# Patient Record
Sex: FEMALE | Race: WHITE | Employment: FULL TIME | URBAN - METROPOLITAN AREA
[De-identification: names, ages, dates, MRNs, and addresses within clinical notes are randomized per-mention and may not be internally consistent; named-entity substitution may affect disease eponyms.]

---

## 2017-06-01 ENCOUNTER — EXTERNAL ORDER RESULTS (OUTPATIENT)
Dept: HEALTH INFORMATION MANAGEMENT | Facility: CLINIC | Age: 52
End: 2017-06-01

## 2017-07-13 ENCOUNTER — TRANSFERRED RECORDS (OUTPATIENT)
Dept: HEALTH INFORMATION MANAGEMENT | Facility: CLINIC | Age: 52
End: 2017-07-13

## 2017-08-03 ENCOUNTER — TRANSFERRED RECORDS (OUTPATIENT)
Dept: HEALTH INFORMATION MANAGEMENT | Facility: CLINIC | Age: 52
End: 2017-08-03

## 2017-12-21 ENCOUNTER — OFFICE VISIT (OUTPATIENT)
Dept: FAMILY MEDICINE | Facility: CLINIC | Age: 52
End: 2017-12-21
Payer: COMMERCIAL

## 2017-12-21 VITALS
DIASTOLIC BLOOD PRESSURE: 86 MMHG | BODY MASS INDEX: 34.69 KG/M2 | HEIGHT: 67 IN | HEART RATE: 68 BPM | TEMPERATURE: 97.7 F | SYSTOLIC BLOOD PRESSURE: 138 MMHG | WEIGHT: 221 LBS

## 2017-12-21 DIAGNOSIS — E03.9 ACQUIRED HYPOTHYROIDISM: Primary | ICD-10-CM

## 2017-12-21 DIAGNOSIS — Z90.710 S/P HYSTERECTOMY WITH OOPHORECTOMY: ICD-10-CM

## 2017-12-21 DIAGNOSIS — M16.11 ARTHRITIS OF RIGHT HIP: ICD-10-CM

## 2017-12-21 DIAGNOSIS — Z90.721 S/P HYSTERECTOMY WITH OOPHORECTOMY: ICD-10-CM

## 2017-12-21 PROCEDURE — 84439 ASSAY OF FREE THYROXINE: CPT | Performed by: FAMILY MEDICINE

## 2017-12-21 PROCEDURE — 84443 ASSAY THYROID STIM HORMONE: CPT | Performed by: FAMILY MEDICINE

## 2017-12-21 PROCEDURE — 99203 OFFICE O/P NEW LOW 30 MIN: CPT | Performed by: FAMILY MEDICINE

## 2017-12-21 PROCEDURE — 36415 COLL VENOUS BLD VENIPUNCTURE: CPT | Performed by: FAMILY MEDICINE

## 2017-12-21 PROCEDURE — 84480 ASSAY TRIIODOTHYRONINE (T3): CPT | Performed by: FAMILY MEDICINE

## 2017-12-21 RX ORDER — LEVOTHYROXINE SODIUM 75 UG/1
75 TABLET ORAL DAILY
COMMUNITY
End: 2018-03-20

## 2017-12-21 NOTE — PROGRESS NOTES
"  SUBJECTIVE:   Gilma Hargrove is a 52 year old female who presents to clinic today for the following health issues:      Establish care       History of acquired hypothyroidism, does not recall diagnosis of Hashimoto's.     Has been on synthroid the past 2 years.     History of right hip OZ, has kept her from working out regularly, which she enjoys. Working to find an exercise routine that she can manage. Has not pursued injections or PT.     MOIZ-BSO 2009, has been on HRT since. Feels she is doing well. Remembers irritability and trouble sleeping before HRT. Aware of the risks. UTD with screening mammogram. Has not tried to wean at this point, OBGYN discussed weaning next year.       Problem list and histories reviewed & adjusted, as indicated.  Additional history: as documented    Patient Active Problem List   Diagnosis     Acquired hypothyroidism     S/P hysterectomy with oophorectomy     Arthritis of right hip     Past Surgical History:   Procedure Laterality Date     HYSTERECTOMY, PAP NO LONGER INDICATED         Social History   Substance Use Topics     Smoking status: Never Smoker     Smokeless tobacco: Never Used     Alcohol use Yes      Comment: occ     Family History   Problem Relation Age of Onset     Thyroid Cancer Mother      Other Cancer Father      pancrease     Arthritis Father              Reviewed and updated as needed this visit by clinical staff       Reviewed and updated as needed this visit by Provider         ROS:  Constitutional, HEENT, cardiovascular, pulmonary, gi and gu systems are negative, except as otherwise noted.      OBJECTIVE:   /86 (BP Location: Right arm, Patient Position: Sitting, Cuff Size: Adult Large)  Pulse 68  Temp 97.7  F (36.5  C) (Oral)  Ht 5' 7.25\" (1.708 m)  Wt 221 lb (100.2 kg)  Breastfeeding? No  BMI 34.36 kg/m2  Body mass index is 34.36 kg/(m^2).  GENERAL: healthy, alert and no distress  NECK: no adenopathy, no asymmetry, masses, or scars and thyroid normal to " palpation  RESP: lungs clear to auscultation - no rales, rhonchi or wheezes  CV: regular rate and rhythm, normal S1 S2, no S3 or S4, no murmur, click or rub, no peripheral edema and peripheral pulses strong  PSYCH: mentation appears normal, affect normal/bright    Diagnostic Test Results:  none     ASSESSMENT/PLAN:     1. Acquired hypothyroidism - will check levels today, refills based on labs  - levothyroxine (SYNTHROID/LEVOTHROID) 75 MCG tablet; Take 75 mcg by mouth daily  - estropipate (OGEN) 1.5 MG tablet; Take 1.5 mg by mouth daily  - FLUTICASONE PROPIONATE, NASAL, NA; Spray 50 mcg in nostril daily as needed  - TSH  - T4, free  - T3, total    2. S/P hysterectomy with oophorectomy - continue hormone, reviewed risks of HRT, discuss weaning 1 year.     3. Arthritis of right hip - discussed options, she will continue with her current plan for now.     Chyna Blunt MD  [facility]

## 2017-12-21 NOTE — MR AVS SNAPSHOT
"              After Visit Summary   12/21/2017    Gilma Hargrove    MRN: [id number]           Patient Information     Date Of Birth          1965        Visit Information        Provider Department      12/21/2017 4:30 PM Chyna Blunt MD [facility]        Today's Diagnoses     Acquired hypothyroidism    -  1    S/P hysterectomy with oophorectomy        Arthritis of right hip           Follow-ups after your visit        Who to contact     If you have questions or need follow up information about today's clinic visit or your schedule please contact [facility] directly at [phone].  Normal or non-critical lab and imaging results will be communicated to you by Yorx[employer]art, letter or phone within 4 business days after the clinic has received the results. If you do not hear from us within 7 days, please contact the clinic through Yorxshart or phone. If you have a critical or abnormal lab result, we will notify you by phone as soon as possible.  Submit refill requests through [facility] or call your pharmacy and they will forward the refill request to us. Please allow 3 business days for your refill to be completed.          Additional Information About Your Visit        MyChart Information     [facility] gives you secure access to your electronic health record. If you see a primary care provider, you can also send messages to your care team and make appointments. If you have questions, please call your primary care clinic.  If you do not have a primary care provider, please call [phone] and they will assist you.        Care EveryWhere ID     This is your Care EveryWhere ID. This could be used by other organizations to access your [place] medical records  [id number]        Your Vitals Were     Pulse Temperature Height Breastfeeding? BMI (Body Mass Index)       68 97.7  F (36.5  C) (Oral) 5' 7.25\" (1.708 m) No 34.36 kg/m2        Blood Pressure from Last 3 Encounters:   12/21/17 " 138/86    Weight from Last 3 Encounters:   12/21/17 221 lb (100.2 kg)              We Performed the Following     T3, total     T4, free     TSH        Primary Care Provider Office Phone # Fax #    Chyna Blunt [phone] [id number]  [facility] [address]        Equal Access to Services     [facility]: Hadii aad ku hadasho Soomaali, waaxda luqadaha, qaybta kaalmada adeegyada, waxay idiin hayaan adeeg khangelitosh laMartineaan . So [facility] [phone].    ATENCIÓN: Si habla español, tiene a kuo disposición servicios gratuitos de asistencia lingüística. Llame al [phone].    We comply with applicable federal civil rights laws and [place] laws. We do not discriminate on the basis of race, color, national origin, age, disability, sex, sexual orientation, or gender identity.            Thank you!     Thank you for choosing [facility]  for your care. Our goal is always to provide you with excellent care. Hearing back from our patients is one way we can continue to improve our services. Please take a few minutes to complete the written survey that you may receive in the mail after your visit with us. Thank you!             Your Updated Medication List - Protect others around you: Learn how to safely use, store and throw away your medicines at www.disposemymeds.org.          This list is accurate as of: 12/21/17 11:59 PM.  Always use your most recent med list.                   Brand Name Dispense Instructions for use Diagnosis    estropipate 1.5 MG tablet    OGEN     Take 1.5 mg by mouth daily    Acquired hypothyroidism       FLUTICASONE PROPIONATE (NASAL) NA      Spray 50 mcg in nostril daily as needed    Acquired hypothyroidism       levothyroxine 75 MCG tablet    SYNTHROID/LEVOTHROID     Take 75 mcg by mouth daily    Acquired hypothyroidism

## 2017-12-21 NOTE — Clinical Note
Please abstract the following data from this visit with this patient into the appropriate field in Epic:  Mammogram done on this date: 6/1/2017 (approximately), by this group: [facility], results were negative.

## 2017-12-21 NOTE — NURSING NOTE
"Chief Complaint   Patient presents with     Establish Care     tsh and refills needed.       Initial /86 (BP Location: Right arm, Patient Position: Sitting, Cuff Size: Adult Large)  Pulse 68  Temp 97.7  F (36.5  C) (Oral)  Ht 5' 7.25\" (1.708 m)  Wt 221 lb (100.2 kg)  Breastfeeding? No  BMI 34.36 kg/m2 Estimated body mass index is 34.36 kg/(m^2) as calculated from the following:    Height as of this encounter: 5' 7.25\" (1.708 m).    Weight as of this encounter: 221 lb (100.2 kg).  Medication Reconciliation: complete     Health maintenance- pt filled out david for last place. Waiting on records.     Earl Stock CMA            "

## 2017-12-22 LAB
T3 SERPL-MCNC: 95 NG/DL (ref 60–181)
T4 FREE SERPL-MCNC: 0.91 NG/DL (ref 0.76–1.46)
TSH SERPL DL<=0.005 MIU/L-ACNC: 1.72 MU/L (ref 0.4–4)

## 2017-12-23 ENCOUNTER — HEALTH MAINTENANCE LETTER (OUTPATIENT)
Age: 52
End: 2017-12-23

## 2018-01-02 ENCOUNTER — TELEPHONE (OUTPATIENT)
Dept: FAMILY MEDICINE | Facility: CLINIC | Age: 53
End: 2018-01-02

## 2018-01-02 NOTE — TELEPHONE ENCOUNTER
Pt calling with pain in right eye that is stabbing, watering a lot, swelling of eyelid but has had this in the past. She states the symptoms are starting to get better at this moment. Recently moved here and wondering who clinic recommends due to her history of dry eye for an eye doctor.  Recommendation of [facility] with phone number given to patient.    Hesham Jeronimo RN, BSN

## 2018-03-15 ENCOUNTER — OFFICE VISIT (OUTPATIENT)
Dept: FAMILY MEDICINE | Facility: CLINIC | Age: 53
End: 2018-03-15
Payer: COMMERCIAL

## 2018-03-15 VITALS
SYSTOLIC BLOOD PRESSURE: 126 MMHG | DIASTOLIC BLOOD PRESSURE: 82 MMHG | HEART RATE: 73 BPM | HEIGHT: 67 IN | TEMPERATURE: 97.9 F | WEIGHT: 222 LBS | BODY MASS INDEX: 34.84 KG/M2

## 2018-03-15 DIAGNOSIS — M77.8 DELTOID TENDINITIS OF LEFT SHOULDER: ICD-10-CM

## 2018-03-15 DIAGNOSIS — M75.92 LEFT SUPRASPINATUS TENDINITIS: Primary | ICD-10-CM

## 2018-03-15 DIAGNOSIS — Z12.11 SPECIAL SCREENING FOR MALIGNANT NEOPLASMS, COLON: ICD-10-CM

## 2018-03-15 PROCEDURE — 99213 OFFICE O/P EST LOW 20 MIN: CPT | Performed by: FAMILY MEDICINE

## 2018-03-15 NOTE — MR AVS SNAPSHOT
After Visit Summary   3/15/2018    Gilma Hargrove    MRN: [id number]           Patient Information     Date Of Birth          1965        Visit Information        Provider Department      3/15/2018 3:40 PM Chyna Blunt MD [facility]        Today's Diagnoses     Left supraspinatus tendinitis    -  1    Deltoid tendinitis of left shoulder        Special screening for malignant neoplasms, colon           Follow-ups after your visit        Additional Services     [facility] PT, HAND, AND CHIROPRACTIC REFERRAL       **This order will print in the [facility] Scheduling Office**    Physical Therapy, Hand Therapy and Chiropractic Care are available through:    *[facility]  *[facility]  *[facility]    Call one number to schedule at any of the above locations: [phone].    Your provider has referred you to: Physical Therapy at [facility] or [place]    Indication/Reason for Referral: Shoulder Pain, left  Onset of Illness: months  Therapy Orders: Evaluate and Treat  Special Programs: None  Special Request: None    Denae Philippe      Additional Comments for the Therapist or Chiropractor:    Please be aware that coverage of these services is subject to the terms and limitations of your health insurance plan.  Call member services at your health plan with any benefit or coverage questions.      Please bring the following to your appointment:    *Your personal calendar for scheduling future appointments  *Comfortable clothing                  Future tests that were ordered for you today     Open Future Orders        Priority Expected Expires Ordered    Fecal colorectal cancer screen (FIT) Routine 4/5/2018 6/7/2018 3/15/2018            Who to contact     If you have questions or need follow up information about today's clinic visit or your schedule please contact [facility] directly at [phone].  Normal or non-critical lab and imaging  "results will be communicated to you by MyChart, letter or phone within 4 business days after the clinic has received the results. If you do not hear from us within 7 days, please contact the clinic through [employer] or phone. If you have a critical or abnormal lab result, we will notify you by phone as soon as possible.  Submit refill requests through [employer] or call your pharmacy and they will forward the refill request to us. Please allow 3 business days for your refill to be completed.          Additional Information About Your Visit        [employer] Information     [employer] gives you secure access to your electronic health record. If you see a primary care provider, you can also send messages to your care team and make appointments. If you have questions, please call your primary care clinic.  If you do not have a primary care provider, please call [phone] and they will assist you.        Care EveryWhere ID     This is your Care EveryWhere ID. This could be used by other organizations to access your [place] medical records  [id number]        Your Vitals Were     Pulse Temperature Height Breastfeeding? BMI (Body Mass Index)       73 97.9  F (36.6  C) (Oral) 5' 7.25\" (1.708 m) No 34.51 kg/m2        Blood Pressure from Last 3 Encounters:   03/15/18 126/82   12/21/17 138/86    Weight from Last 3 Encounters:   03/15/18 222 lb (100.7 kg)   12/21/17 221 lb (100.2 kg)              We Performed the Following     [facility] PT, HAND, AND CHIROPRACTIC REFERRAL        Primary Care Provider Office Phone # Fax #    Chyna Carlos Alberto Blunt [phone] [id number]  [facility] [address]        Equal Access to Services     [facility] JANAE : Hadii charlene cui Solalitha, waaxda luqadaha, qaybta kaalmada marilyn franco. So [facility] [phone].    ATENCIÓN: Si habla español, tiene a kuo disposición servicios gratuitos de asistencia lingüística. Llame al [phone].    We comply " with applicable federal civil rights laws and [place] laws. We do not discriminate on the basis of race, color, national origin, age, disability, sex, sexual orientation, or gender identity.            Thank you!     Thank you for choosing [facility]  for your care. Our goal is always to provide you with excellent care. Hearing back from our patients is one way we can continue to improve our services. Please take a few minutes to complete the written survey that you may receive in the mail after your visit with us. Thank you!             Your Updated Medication List - Protect others around you: Learn how to safely use, store and throw away your medicines at www.disposemymeds.org.          This list is accurate as of 3/15/18  4:14 PM.  Always use your most recent med list.                   Brand Name Dispense Instructions for use Diagnosis    estropipate 1.5 MG tablet    OGEN     Take 1.5 mg by mouth daily    Acquired hypothyroidism       FLUTICASONE PROPIONATE (NASAL) NA      Spray 50 mcg in nostril daily as needed    Acquired hypothyroidism       levothyroxine 75 MCG tablet    SYNTHROID/LEVOTHROID     Take 75 mcg by mouth daily    Acquired hypothyroidism

## 2018-03-15 NOTE — PROGRESS NOTES
"  SUBJECTIVE:   Gilma Hargrove is a 52 year old female who presents to clinic today for the following health issues:      Musculoskeletal problem/pain      Duration: off and on since last fall    Description  Location: left deltoid    Intensity:  2/10. Gets to 4/10 with movement    Accompanying signs and symptoms: radiation of pain to upper shoulder    History  Previous similar problem: YES  Previous evaluation:  none    Precipitating or alleviating factors:  Trauma or overuse: not sure how injury occurred  Aggravating factors include: raising the arm up. Range of motion is down    Therapies tried and outcome: ice and stretching helps      Pain over the lateral shoulder.  First started after her move to [place], unloading boxes.   Overall pain has improved, but still lingering.     No history of shoulder issues.       Problem list and histories reviewed & adjusted, as indicated.  Additional history: none    Patient Active Problem List   Diagnosis     Acquired hypothyroidism     S/P hysterectomy with oophorectomy     Arthritis of right hip     Past Surgical History:   Procedure Laterality Date     HYSTERECTOMY, PAP NO LONGER INDICATED         Social History   Substance Use Topics     Smoking status: Never Smoker     Smokeless tobacco: Never Used     Alcohol use Yes      Comment: occ     Family History   Problem Relation Age of Onset     Thyroid Cancer Mother      Other Cancer Father      pancrease     Arthritis Father            Reviewed and updated as needed this visit by clinical staff  Allergies       Reviewed and updated as needed this visit by Provider         ROS:  Constitutional, HEENT, cardiovascular, pulmonary, gi and gu systems are negative, except as otherwise noted.    OBJECTIVE:     /82 (BP Location: Right arm, Patient Position: Sitting, Cuff Size: Adult Large)  Pulse 73  Temp 97.9  F (36.6  C) (Oral)  Ht 5' 7.25\" (1.708 m)  Wt 222 lb (100.7 kg)  Breastfeeding? No  BMI 34.51 kg/m2  Body mass index is " 34.51 kg/(m^2).  GENERAL: healthy, alert and no distress  MS: left shoulder - abduction and internal rotation limited 2/2 pain, normal external rotation, + Obrien, ttp over deltoid and supraspinatous tendon insertion, negative empty can testing    Diagnostic Test Results:  none     ASSESSMENT/PLAN:     1. Left supraspinatus tendinitis - discussed likely diagnosis, suggested PT as our first step.   - [facility] PT, HAND, AND CHIROPRACTIC REFERRAL    2. Deltoid tendinitis of left shoulder - as above  - [facility] PT, HAND, AND CHIROPRACTIC REFERRAL    3. Special screening for malignant neoplasms, colon   - Fecal colorectal cancer screen (FIT); Future    Chyna Blunt MD  [facility]

## 2018-03-15 NOTE — NURSING NOTE
"  Chief Complaint   Patient presents with     Musculoskeletal Problem       Initial /82 (BP Location: Right arm, Patient Position: Sitting, Cuff Size: Adult Large)  Pulse 73  Temp 97.9  F (36.6  C) (Oral)  Ht 5' 7.25\" (1.708 m)  Wt 222 lb (100.7 kg)  Breastfeeding? No  BMI 34.51 kg/m2 Estimated body mass index is 34.51 kg/(m^2) as calculated from the following:    Height as of this encounter: 5' 7.25\" (1.708 m).    Weight as of this encounter: 222 lb (100.7 kg).  Medication Reconciliation: complete      Health maintenance- hysterectomy cervix removed. Colonoscopy due. Fit test    Earl Stock CMA          "

## 2018-03-20 DIAGNOSIS — E03.9 ACQUIRED HYPOTHYROIDISM: ICD-10-CM

## 2018-03-20 NOTE — TELEPHONE ENCOUNTER
"Requested Prescriptions   Pending Prescriptions Disp Refills     levothyroxine (SYNTHROID/LEVOTHROID) 75 MCG tablet 30 tablet       MEDICATION IS HISTORICAL    Last Written Prescription Date:  12/21/2017  Last Fill Quantity: ,  # refills:    Last office visit: 3/15/2018 with prescribing provider:  03/15/2018   Future Office Visit:     Sig: Take 1 tablet (75 mcg) by mouth daily    Thyroid Protocol Passed    3/20/2018  7:35 AM       Passed - Patient is 12 years or older       Passed - Recent (12 mo) or future (30 days) visit within the authorizing provider's specialty    Patient had office visit in the last 12 months or has a visit in the next 30 days with authorizing provider or within the authorizing provider's specialty.  See \"Patient Info\" tab in inbasket, or \"Choose Columns\" in Meds & Orders section of the refill encounter.           Passed - Normal TSH on file in past 12 months    Recent Labs   Lab Test  12/21/17   1721   TSH  1.72             Passed - No active pregnancy on record    If patient is pregnant or has had a positive pregnancy test, please check TSH.         Passed - No positive pregnancy test in past 12 months    If patient is pregnant or has had a positive pregnancy test, please check TSH.            Sunny Wallace XRT  "

## 2018-03-20 NOTE — TELEPHONE ENCOUNTER
Routing refill request to provider for review/approval because:  Medication is reported/historical  Hesham Jeronimo RN, BSN

## 2018-03-21 RX ORDER — LEVOTHYROXINE SODIUM 75 UG/1
75 TABLET ORAL DAILY
Qty: 90 TABLET | Refills: 3 | Status: SHIPPED | OUTPATIENT
Start: 2018-03-21 | End: 2019-03-13

## 2018-04-02 ENCOUNTER — THERAPY VISIT (OUTPATIENT)
Dept: PHYSICAL THERAPY | Facility: CLINIC | Age: 53
End: 2018-04-02
Payer: COMMERCIAL

## 2018-04-02 DIAGNOSIS — M75.02 ADHESIVE CAPSULITIS OF LEFT SHOULDER: Primary | ICD-10-CM

## 2018-04-02 PROCEDURE — 97140 MANUAL THERAPY 1/> REGIONS: CPT | Mod: GP | Performed by: PHYSICAL THERAPIST

## 2018-04-02 PROCEDURE — 97110 THERAPEUTIC EXERCISES: CPT | Mod: GP | Performed by: PHYSICAL THERAPIST

## 2018-04-02 PROCEDURE — 97161 PT EVAL LOW COMPLEX 20 MIN: CPT | Mod: GP | Performed by: PHYSICAL THERAPIST

## 2018-04-02 NOTE — PROGRESS NOTES
[facility] Initial Evaluation  Subjective:  Patient is a 52 year old female presenting with rehab left shoulder hpi.           Pt describes left shoulder pain and limited ROM.  Began in the Fall of 2017 after lifting heavy boxes and objects while moving.  The pain severity has slowly improved, but her ROM has worsened.  .    Patient reports pain:  Lateral and upper arm.    Pain is described as stabbing and is intermittent and reported as 9/10.  Associated symptoms:  Loss of motion/stiffness. Pain is the same all the time.  Symptoms are exacerbated by lying on extremity, using arm overhead and using arm behind back and relieved by nothing.  Since onset symptoms are unchanged.        General health as reported by patient is good.                  Barriers include:  None as reported by the patient.    Red flags:  None as reported by the patient.                        Objective:  System                   Shoulder Evaluation:  ROM:  AROM:    Flexion:  Left:  100    Right:  Full    Abduction:  Left: 90   Right:  Full                  Extension/Internal Rotation:  Left:  Bottom of sacrum    Right:  T7    PROM:    Flexion:  Left:  117    Right: 163      Abduction:  Left:  90    Right:  180    Internal Rotation:  Left:  14    Right:  49  External Rotation:  Left:  27    Right:  92                    Strength:    Flexion: Left:5/5    Pain: -/+        Abduction:  Left: 5/5   Pain:-/+        Internal Rotation:  Left:5/5      Pain:-/+      External Rotation:   Left:5/5     Pain:         Elbow Flexion:  Left:5/5     Pain:              Mobility Tests:        Glenohumeral inferior left:  Hypomobile                                             General     ROS    Assessment/Plan:    Patient is a 52 year old female with left side shoulder complaints.    Patient has the following significant findings with corresponding treatment plan.                Diagnosis 1:  Left adhesive capsulitis  Pain -  hot/cold therapy,  manual therapy, education and home program  Decreased ROM/flexibility - manual therapy, therapeutic exercise and home program    Therapy Evaluation Codes:   1) History comprised of:   Personal factors that impact the plan of care:      None.    Comorbidity factors that impact the plan of care are:      None.     Medications impacting care: None.  2) Examination of Body Systems comprised of:   Body structures and functions that impact the plan of care:      Shoulder.   Activity limitations that impact the plan of care are:      Dressing and Lifting.  3) Clinical presentation characteristics are:   Stable/Uncomplicated.  4) Decision-Making    Low complexity using standardized patient assessment instrument and/or measureable assessment of functional outcome.  Cumulative Therapy Evaluation is: Low complexity.    Previous and current functional limitations:  (See Goal Flow Sheet for this information)    Short term and Long term goals: (See Goal Flow Sheet for this information)     Communication ability:  Patient appears to be able to clearly communicate and understand verbal and written communication and follow directions correctly.  Treatment Explanation - The following has been discussed with the patient:   RX ordered/plan of care  Anticipated outcomes  Possible risks and side effects  This patient would benefit from PT intervention to resume normal activities.   Rehab potential is good.    Frequency:  2 X a month, once daily  Duration:  for 3 months  Discharge Plan:  Achieve all LTG.  Independent in home treatment program.  Reach maximal therapeutic benefit.    Please refer to the daily flowsheet for treatment today, total treatment time and time spent performing 1:1 timed codes.

## 2018-04-03 NOTE — PROGRESS NOTES
[facility] Initial Evaluation  Subjective:  Patient is a 52 year old female presenting with rehab left ankle/foot hpi.                                      Pertinent medical history includes:  History of fractures, overweight, high blood pressure, thyroid problems, anemia, asthma and other.  Medical allergies: sulpha drugs, codine.  Surgical history: left leg x2, abdominal x2, last one total hysterectomy.  Current medications:  Hormone replacement therapy and thyroid medication.  Current occupation is education.    Employment tasks: classroom- so it varies between standing, moving, sitting, computer.                                Objective:  System    Physical Exam    General     ROS    Assessment/Plan:

## 2018-04-05 ENCOUNTER — THERAPY VISIT (OUTPATIENT)
Dept: PHYSICAL THERAPY | Facility: CLINIC | Age: 53
End: 2018-04-05
Payer: COMMERCIAL

## 2018-04-05 DIAGNOSIS — M75.02 ADHESIVE CAPSULITIS OF LEFT SHOULDER: ICD-10-CM

## 2018-04-05 PROCEDURE — 97110 THERAPEUTIC EXERCISES: CPT | Mod: GP | Performed by: PHYSICAL THERAPIST

## 2018-04-05 PROCEDURE — 97140 MANUAL THERAPY 1/> REGIONS: CPT | Mod: GP | Performed by: PHYSICAL THERAPIST

## 2018-04-19 ENCOUNTER — THERAPY VISIT (OUTPATIENT)
Dept: PHYSICAL THERAPY | Facility: CLINIC | Age: 53
End: 2018-04-19
Payer: COMMERCIAL

## 2018-04-19 DIAGNOSIS — M75.02 ADHESIVE CAPSULITIS OF LEFT SHOULDER: ICD-10-CM

## 2018-04-19 PROCEDURE — 97140 MANUAL THERAPY 1/> REGIONS: CPT | Mod: GP | Performed by: PHYSICAL THERAPIST

## 2018-04-19 PROCEDURE — 97110 THERAPEUTIC EXERCISES: CPT | Mod: GP | Performed by: PHYSICAL THERAPIST

## 2018-07-23 ENCOUNTER — OFFICE VISIT (OUTPATIENT)
Dept: FAMILY MEDICINE | Facility: CLINIC | Age: 53
End: 2018-07-23
Payer: COMMERCIAL

## 2018-07-23 VITALS
OXYGEN SATURATION: 98 % | DIASTOLIC BLOOD PRESSURE: 68 MMHG | TEMPERATURE: 97.8 F | SYSTOLIC BLOOD PRESSURE: 123 MMHG | BODY MASS INDEX: 34.2 KG/M2 | WEIGHT: 217.9 LBS | HEART RATE: 106 BPM | HEIGHT: 67 IN

## 2018-07-23 DIAGNOSIS — Z90.710 S/P HYSTERECTOMY WITH OOPHORECTOMY: Primary | ICD-10-CM

## 2018-07-23 DIAGNOSIS — Z12.11 SPECIAL SCREENING FOR MALIGNANT NEOPLASMS, COLON: ICD-10-CM

## 2018-07-23 DIAGNOSIS — Z90.721 S/P HYSTERECTOMY WITH OOPHORECTOMY: Primary | ICD-10-CM

## 2018-07-23 PROCEDURE — 99213 OFFICE O/P EST LOW 20 MIN: CPT | Performed by: FAMILY MEDICINE

## 2018-07-23 RX ORDER — CETIRIZINE HYDROCHLORIDE 10 MG/1
10 TABLET ORAL DAILY
COMMUNITY

## 2018-07-23 NOTE — PROGRESS NOTES
"  SUBJECTIVE:   Gilma Hargrove is a 52 year old female who presents to clinic today for the following health issues:      Medication Followup of estropipate    Taking Medication as prescribed: yes - wants to know if she should be tapering off     Side Effects:  none    Medication Helping Symptoms:  Yes - helping with hot flashes       Taking 1.5 mg Ogen daily since her MOIZ-SBO 3 years ago. Was told by her OBGYN that she should taper off around this time.     Having mild hot flashes currently.   Initially, she was having intense mood swings and hot flashes.       Problem list and histories reviewed & adjusted, as indicated.  Additional history: none    Patient Active Problem List   Diagnosis     Acquired hypothyroidism     S/P hysterectomy with oophorectomy     Arthritis of right hip     Adhesive capsulitis of left shoulder     Past Surgical History:   Procedure Laterality Date     HYSTERECTOMY, PAP NO LONGER INDICATED         Social History   Substance Use Topics     Smoking status: Never Smoker     Smokeless tobacco: Never Used     Alcohol use Yes      Comment: occ     Family History   Problem Relation Age of Onset     Thyroid Cancer Mother      Other Cancer Father      pancrease     Arthritis Father            Reviewed and updated as needed this visit by clinical staff  Allergies       Reviewed and updated as needed this visit by Provider         ROS:  Constitutional, HEENT, cardiovascular, pulmonary, gi and gu systems are negative, except as otherwise noted.    OBJECTIVE:     /68 (BP Location: Right arm, Patient Position: Chair, Cuff Size: Adult Large)  Pulse 106  Temp 97.8  F (36.6  C) (Oral)  Ht 5' 7.25\" (1.708 m)  Wt 217 lb 14.4 oz (98.8 kg)  SpO2 98%  BMI 33.87 kg/m2  Body mass index is 33.87 kg/(m^2).  GENERAL: healthy, alert and no distress  PSYCH: mentation appears normal, affect normal/bright    Diagnostic Test Results:  none     ASSESSMENT/PLAN:     1. S/P hysterectomy with oophorectomy - " reviewed tapering dose today, working to get off HRT next 2-3 years as tolerated. Reviewed potential risks of HRT.Cut dose in half today, RTC in 5 months for RHM and discuss weaning med as tolerated. Advised she contact me if issues with taper.    - estropipate (OGEN) 0.75 MG tablet; Take 1 tablet (0.75 mg) by mouth daily  Dispense: 90 tablet; Refill: 1    2. Special screening for malignant neoplasms, colon  - Fecal colorectal cancer screen (FIT); Future      Chyna Blunt MD  [facility]

## 2018-07-23 NOTE — MR AVS SNAPSHOT
After Visit Summary   7/23/2018    Gilma Hargrove    MRN: [id number]           Patient Information     Date Of Birth          1965        Visit Information        Provider Department      7/23/2018 11:20 AM Chyna Blunt MD [facility]        Today's Diagnoses     S/P hysterectomy with oophorectomy    -  1    Special screening for malignant neoplasms, colon           Follow-ups after your visit        Follow-up notes from your care team     Return in about 5 months (around 12/23/2018) for Yearly Physical Exam.      Future tests that were ordered for you today     Open Future Orders        Priority Expected Expires Ordered    Fecal colorectal cancer screen (FIT) Routine 8/13/2018 10/15/2018 7/23/2018            Who to contact     If you have questions or need follow up information about today's clinic visit or your schedule please contact [facility] directly at [phone].  Normal or non-critical lab and imaging results will be communicated to you by MyChart, letter or phone within 4 business days after the clinic has received the results. If you do not hear from us within 7 days, please contact the clinic through [employer] or phone. If you have a critical or abnormal lab result, we will notify you by phone as soon as possible.  Submit refill requests through [employer] or call your pharmacy and they will forward the refill request to us. Please allow 3 business days for your refill to be completed.          Additional Information About Your Visit        MyChart Information     [facility] gives you secure access to your electronic health record. If you see a primary care provider, you can also send messages to your care team and make appointments. If you have questions, please call your primary care clinic.  If you do not have a primary care provider, please call [phone] and they will assist you.        Care EveryWhere ID     This is your Care EveryWhere ID. This  "could be used by other organizations to access your [facility] medical records  [id number]        Your Vitals Were     Pulse Temperature Height Pulse Oximetry BMI (Body Mass Index)       106 97.8  F (36.6  C) (Oral) 5' 7.25\" (1.708 m) 98% 33.87 kg/m2        Blood Pressure from Last 3 Encounters:   07/23/18 123/68   03/15/18 126/82   12/21/17 138/86    Weight from Last 3 Encounters:   07/23/18 217 lb 14.4 oz (98.8 kg)   03/15/18 222 lb (100.7 kg)   12/21/17 221 lb (100.2 kg)                 Today's Medication Changes          These changes are accurate as of 7/23/18 11:38 AM.  If you have any questions, ask your nurse or doctor.               These medicines have changed or have updated prescriptions.        Dose/Directions    estropipate 0.75 MG tablet   Commonly known as:  OGEN   This may have changed:    - medication strength  - how much to take   Used for:  S/P hysterectomy with oophorectomy   Changed by:  Chyna Blunt MD        Dose:  0.75 mg   Take 1 tablet (0.75 mg) by mouth daily   Quantity:  90 tablet   Refills:  1            Where to get your medicines      These medications were sent to [facility] [place] [id number] [place] [address] [id number] [place] [address] AT [address]  [address] [facility] [address]     Phone:  [phone]     estropipate 0.75 MG tablet                Primary Care Provider Office Phone # Fax #    Chyna Blunt [phone] [address] [facility] [address]        Equal Access to Services     MARSHALL CARDOSO AH: Hadii charlene cui Solalitha, waaxda luqadaha, qaybta kaalmada adeegyada, marilyn echols. So [facility] [phone].    ATENCIÓN: Si habla español, tiene a kuo disposición servicios gratuitos de asistencia lingüística. Llame al [phone].    We comply with applicable federal civil rights laws and [place] laws. We do not discriminate on the basis of race, color, national origin, age, disability, sex, " sexual orientation, or gender identity.            Thank you!     Thank you for choosing [facility]  for your care. Our goal is always to provide you with excellent care. Hearing back from our patients is one way we can continue to improve our services. Please take a few minutes to complete the written survey that you may receive in the mail after your visit with us. Thank you!             Your Updated Medication List - Protect others around you: Learn how to safely use, store and throw away your medicines at www.disposemymeds.org.          This list is accurate as of 7/23/18 11:38 AM.  Always use your most recent med list.                   Brand Name Dispense Instructions for use Diagnosis    cetirizine 10 MG tablet    zyrTEC     Take 10 mg by mouth daily    S/P hysterectomy with oophorectomy       estropipate 0.75 MG tablet    OGEN    90 tablet    Take 1 tablet (0.75 mg) by mouth daily    S/P hysterectomy with oophorectomy       FLUTICASONE PROPIONATE (NASAL) NA      Spray 50 mcg in nostril daily as needed    Acquired hypothyroidism       levothyroxine 75 MCG tablet    SYNTHROID/LEVOTHROID    90 tablet    Take 1 tablet (75 mcg) by mouth daily    Acquired hypothyroidism

## 2018-08-03 PROBLEM — M75.02 ADHESIVE CAPSULITIS OF LEFT SHOULDER: Status: RESOLVED | Noted: 2018-04-02 | Resolved: 2018-08-03

## 2018-08-03 NOTE — PROGRESS NOTES
Subjective:  HPI                    Objective:  System    Physical Exam    General     ROS    Assessment/Plan:    DISCHARGE REPORT    Progress reporting period is from 4/2/18 to 4/19/18 (3 visits).       SUBJECTIVE  Subjective changes noted by patient:  Gilma did not attend further PT following her last visit on 4/19/18.  Her status at that time was as follows: Subjective: Can reach behind back better and above shoulder level better.  Some nights has no pain, and other nights awakens with pain.    Current pain level is NA  .     Previous pain level was   Initial Pain level: 9/10.   Changes in function:  Unknown.  Adverse reaction to treatment or activity: None    OBJECTIVE  Changes noted in objective findings:  The objective findings below are from DOS 4/19/18.  Objective: PROM: ER=45 (in 45 degrees abd), Flex=142, IR=29.  AROM: Flex=122.     ASSESSMENT/PLAN  Updated problem list and treatment plan: Diagnosis 1:  Left shoulder adhesive capsulitis    STG/LTGs have been met or progress has been made towards goals:  Unknown.  Assessment of Progress: The patient has not returned to therapy. Current status is unknown.  Self Management Plans:  Patient has been instructed in a home treatment program.    Gilma continues to require the following intervention to meet STG and LTG's:  PT intervention is no longer required to meet STG/LTG.    Recommendations:  This patient is ready to be discharged from therapy and continue their home treatment program.    Please refer to the daily flowsheet for treatment today, total treatment time and time spent performing 1:1 timed codes.

## 2018-09-19 ENCOUNTER — TELEPHONE (OUTPATIENT)
Dept: FAMILY MEDICINE | Facility: CLINIC | Age: 53
End: 2018-09-19

## 2018-09-19 DIAGNOSIS — Z90.710 S/P HYSTERECTOMY WITH OOPHORECTOMY: Primary | ICD-10-CM

## 2018-09-19 DIAGNOSIS — Z90.721 S/P HYSTERECTOMY WITH OOPHORECTOMY: Primary | ICD-10-CM

## 2018-09-19 NOTE — TELEPHONE ENCOUNTER
"Pt calling with update on symptoms since decreasing hormone.     Pt decreased from 1.5 mg Ogen to 0.75 mg the first part of August.       She has noted not sleeping as well again, feeling \"foogy\"   More emotional \"I am stone and weepy at times\"    She has noted some increase in night time hot flashes but nothing during the day time hours.      She has also noted hives started on arms and shoulders which was an issue before she started med and also ankle/foot swelling, \"this has improved some with the weather cooling down\"     Please advise more time or increase in medication     Angela Schmitt RN    "

## 2018-09-20 NOTE — TELEPHONE ENCOUNTER
Pt states she does want to give it a bit more time.       Will notify clinic if goes back to increased dosing.     Angela Schmitt RN

## 2018-09-20 NOTE — TELEPHONE ENCOUNTER
She can either give more time or increase dose. I have sent in #30 1.5 mg Ogen for her if she decides on the latter. JH

## 2018-09-26 NOTE — TELEPHONE ENCOUNTER
Fax from pharmacy ([facility])   Estropipate is unavailable in either 1.5 or 0.75 mg     Per PCP can switch to estrogen product if desires     LM for back     Angela Schmitt RN

## 2018-10-02 RX ORDER — ESTRADIOL 0.5 MG/1
0.5 TABLET ORAL DAILY
Qty: 90 TABLET | Refills: 3 | Status: SHIPPED | OUTPATIENT
Start: 2018-10-02 | End: 2018-11-01

## 2018-10-23 NOTE — TELEPHONE ENCOUNTER
"Requested Prescriptions   Pending Prescriptions Disp Refills     estropipate (OGEN) 0.75 MG tablet  Last Written Prescription Date:  07/23/2018  Last Fill Quantity: 90,  # refills: 1   Last office visit: No previous visit found with prescribing provider:  07/23/2018   Future Office Visit:     90 tablet 1     Sig: Take 1 tablet (0.75 mg) by mouth daily    Hormone Replacement Therapy Passed    10/22/2018  7:35 PM       Passed - Blood pressure under 140/90 in past 12 months    BP Readings from Last 3 Encounters:   07/23/18 123/68   03/15/18 126/82   12/21/17 138/86                Passed - Recent (12 mo) or future (30 days) visit within the authorizing provider's specialty    Patient had office visit in the last 12 months or has a visit in the next 30 days with authorizing provider or within the authorizing provider's specialty.  See \"Patient Info\" tab in inbasket, or \"Choose Columns\" in Meds & Orders section of the refill encounter.             Passed - Patient has mammogram in past 2 years on file if age 50-75       Passed - Patient is 18 years of age or older       Passed - No active pregnancy on record       Passed - No positive pregnancy test on record in past 12 months          "

## 2018-11-01 ENCOUNTER — TELEPHONE (OUTPATIENT)
Dept: FAMILY MEDICINE | Facility: CLINIC | Age: 53
End: 2018-11-01

## 2018-11-01 DIAGNOSIS — Z79.890 HORMONE REPLACEMENT THERAPY: Primary | ICD-10-CM

## 2018-11-01 DIAGNOSIS — Z90.721 S/P HYSTERECTOMY WITH OOPHORECTOMY: ICD-10-CM

## 2018-11-01 DIAGNOSIS — Z90.710 S/P HYSTERECTOMY WITH OOPHORECTOMY: ICD-10-CM

## 2018-11-01 NOTE — TELEPHONE ENCOUNTER
Pt calling to request the higher dose of Ogen as she never started the estradiol 0.5mg.  She has been taking the 0.75 mg Ogen.  Back in September this was not available but pharmacy told patient that it is now back.  The lower dose is not working well for her.    Please advise    Hesham Jeronimo RN, BSN

## 2018-11-02 NOTE — TELEPHONE ENCOUNTER
Patient found out that pharmacies are no long has a contract with the manufacture that makes the Ogen medication, her pharmacy did give her 10 pills to get her by. Patient will try the estradiol medication Dr. Blunt recommend and would like the Rx to be [facility] in [place] in the [address] [place] [address].    Fidelia Anaya

## 2018-11-02 NOTE — TELEPHONE ENCOUNTER
Patient called wanting us to know that her pharmacy no longer has a contract with the manufacture of making this medication. They gave her 10 pills to get her through the next couple of days. She is going to call around the pharmacies and see who has this medication.  Fidelia Anaya

## 2018-11-05 RX ORDER — ESTRADIOL 0.5 MG/1
0.5 TABLET ORAL DAILY
Qty: 90 TABLET | Refills: 3 | Status: SHIPPED | OUTPATIENT
Start: 2018-11-05 | End: 2019-10-31

## 2018-11-06 ENCOUNTER — TELEPHONE (OUTPATIENT)
Dept: FAMILY MEDICINE | Facility: CLINIC | Age: 53
End: 2018-11-06

## 2018-11-06 NOTE — TELEPHONE ENCOUNTER
Pt returned call, given message below. She has a FIT test at home. She will collect and mail.     Hair Hung   11/06/18 4:20 PM

## 2018-11-06 NOTE — TELEPHONE ENCOUNTER
Panel Management Review      Patient has the following on her problem list: None      Composite cancer screening  Chart review shows that this patient is due/due soon for the following Colonoscopy  Summary:    Patient is due/failing the following:   COLONOSCOPY    Action needed:   Patient needs referral/order: colonoscopy     Type of outreach:    Phone, left message for patient to call back.     Questions for provider review:    None                                                                                                                                    SELENA Candelario       Chart routed to  .

## 2018-11-09 ENCOUNTER — TELEPHONE (OUTPATIENT)
Dept: FAMILY MEDICINE | Facility: CLINIC | Age: 53
End: 2018-11-09

## 2018-11-09 ENCOUNTER — HOSPITAL ENCOUNTER (OUTPATIENT)
Dept: MAMMOGRAPHY | Facility: CLINIC | Age: 53
Discharge: HOME | End: 2018-11-09
Attending: FAMILY MEDICINE | Admitting: FAMILY MEDICINE
Payer: COMMERCIAL

## 2018-11-09 DIAGNOSIS — Z79.890 HORMONE REPLACEMENT THERAPY: ICD-10-CM

## 2018-11-09 PROCEDURE — 77067 SCR MAMMO BI INCL CAD: CPT

## 2018-11-09 NOTE — TELEPHONE ENCOUNTER
PCP:    Fatimah from [facility] pharmacy called to clarify the dosing on Estrace medication.   The Pt was previously on 0.75 mg of Ogen medication, was switched to Estrace due to availably options of the Ogen.     Pharmacist is wondering if you want to keep the dose the same? So increase the Estrace to 0.75 mg?    Nivia Tiwari RN -- [facility]

## 2018-12-07 ENCOUNTER — VIRTUAL VISIT (OUTPATIENT)
Dept: FAMILY MEDICINE | Facility: OTHER | Age: 53
End: 2018-12-07

## 2018-12-07 ENCOUNTER — HOSPITAL ENCOUNTER (OUTPATIENT)
Dept: ULTRASOUND IMAGING | Facility: CLINIC | Age: 53
Discharge: HOME | End: 2018-12-07
Attending: FAMILY MEDICINE | Admitting: FAMILY MEDICINE
Payer: COMMERCIAL

## 2018-12-07 DIAGNOSIS — R92.8 ABNORMAL MAMMOGRAM: ICD-10-CM

## 2018-12-07 PROCEDURE — 76642 ULTRASOUND BREAST LIMITED: CPT | Mod: LT

## 2018-12-07 NOTE — PROGRESS NOTES
"Date: [id number]  Clinician: Thomas Sanders  Clinician NPI: [id number]  Patient: Gilma Hargrove  Patient : 1965  Patient Address: [address] [place] [address]  Patient Phone: [phone]  Visit Protocol: URI  Patient Summary:  Gilma is a 53 year old ( : 1965 ) female who initiated a Visit for cold, sinus infection, or influenza. When asked the question \"Please sign me up to receive news, health information and promotions from [employer].\", Gilma responded \"No\".    Gilma states her symptoms started gradually 2-3 weeks ago. After her symptoms started, they improved and then got worse again.   Her symptoms consist of facial pain or pressure, a cough, rhinitis, nasal congestion, tooth pain, enlarged lymph nodes, a sore throat, and malaise.   Symptom details     Nasal secretions: The color of her mucus is green.    Cough: Gilma coughs a few times an hour and her cough is more bothersome at night. Phlegm comes into her throat when she coughs. She believes the phlegm causes the cough. The color of the phlegm is yellow, green, and white.     Sore throat: Gilma reports having mild throat pain (1-3 on a 10 point pain scale), does not have exudate on her tonsils, and can swallow liquids. The lymph nodes in her neck are enlarged. A rash has not appeared on the skin since the sore throat started.     Facial pain or pressure: The facial pain or pressure feels worse when bending over or leaning forward.     Tooth pain: The tooth pain is not caused by a cavity, recent dental work, or other mouth problems.      Gilma denies having chills, myalgias, wheezing, headache, ear pain, dyspnea, and fever. She also denies having a sinus infection within the past year, taking antibiotic medication for the symptoms, and having recent facial or sinus surgery in the past 60 days.   Within the past week, Gilma has not been exposed to someone with strep throat. She has not recently been exposed to someone with " influenza. Gilma has been in close contact with the following high risk individuals: people with asthma, heart disease or diabetes.   Weight: 205 lbs   Gilma does not smoke or use smokeless tobacco.   MEDICATIONS: Zyrtec oral, Probiotic oral, fluticasone nasal, levothyroxine oral, estradiol oral, ALLERGIES: codeine, Sulfa (Sulfonamide Antibiotics), cayenne  Clinician Response:  Dear Gilma,  Based on the information provided, you have acute bacterial sinusitis, also known as a sinus infection. Sinus infections are caused by bacteria or a virus and symptoms are almost always identical. The difference between the 2 types of infections is timing.  Sinus infections start as viral infections and symptoms improve on their own in about 7 days. If symptoms have not improved after 7 days or have even worsened, a bacterial infection may have developed.  Medication information  I am prescribing:       Fluticasone 50 mcg/actuation nasal spray. Inhale 2 sprays in each nostril 1 time per day; after 1 week, may adjust to 1 - 2 sprays in each nostril 1 time per day. This medication takes several days to start working, so keep taking it even if it doesn't help right away. There are no refills with this prescription.      Amoxicillin-pot clavulanate 875-125 mg oral tablet. Take 1 tablet by mouth every 12 hours for 10 days. There are no refills with this prescription.     Antibiotics can cause an allergic reaction even if you have taken them without a problem in the past. If you develop a new rash, swelling, or difficulty breathing, stop the medication and be seen in a clinic or urgent care immediately.  A yeast infection is a side effect of taking antibiotics in some women. Please use [facility] to get treatment if you have symptoms of a yeast infection.  Unless you are allergic to the over-the-counter medication(s) below, I recommend using:       Ibuprofen (Advil or store brand) 200 mg oral tablet. Take 1-3 tablets (200-600 mg) by mouth  every 8 hours to help with the discomfort. Make sure to take the ibuprofen with food. Do not exceed 2400 mg in 24 hours.      Oxymetazoline (Afrin or store brand) nasal spray. Use 1 spray in each nostril 2 times a day for a maximum of 3 days. Using this medication more frequently or longer than recommended may cause nasal congestion to reoccur or worsen. Sinus pressure occurs when the tissues lining your sinuses become swollen and inflamed. Afrin nasal spray decreases the swelling to provide the quickest and most effective relief from sinus pressure. Similar to Flonase, Afrin will help reduce swelling in your sinuses. Afrin works differently than Flonase, so be sure to use both nasal sprays.      Over-the-counter medications do not require a prescription. Ask the pharmacist if you have any questions.  Self care  The following tips will keep you as comfortable as possible while you recover:     Rest    Drink plenty of water and other liquids    Take a hot shower to loosen congestion    Use throat lozenges    Gargle with warm salt water (1/4 teaspoon of salt per 8 ounce glass of water)    Suck on frozen items such as popsicles or ice cubes    Drink hot tea with lemon and honey    Take a spoonful of honey to reduce your cough     When to seek care  Please be seen in a clinic or urgent care if any of the following occur:     Symptoms do not start to improve after 3 days of treatment    New symptoms develop, or symptoms become worse     Call 911 or go to the emergency room if you feel that your throat is closing off, you suddenly develop a rash, you are unable to swallow fluids, you are drooling, or you are having difficulty breathing.   Diagnosis: Acute bacterial sinusitis  Diagnosis ICD: J01.90  Prescription: fluticasone 50 mcg/actuation nasal spray,suspension 1 120 spray aerosol with adapter (grams), 30 days supply. Inhale 2 sprays in each nostril 1 time per day; after 1 week, may adjust to 1 - 2 sprays in each  nostril 1 time per day.. Refills: 0, Refill as needed: no, Allow substitutions: yes  Prescription: amoxicillin-pot clavulanate 875-125 mg oral tablet 20 tablet, 10 days supply. Take 1 tablet by mouth every 12 hours for 10 days. Refills: 0, Refill as needed: no, Allow substitutions: yes  Pharmacy: [facility] - [phone] - [address] [place] [phone]

## 2019-02-12 ENCOUNTER — TELEPHONE (OUTPATIENT)
Dept: FAMILY MEDICINE | Facility: CLINIC | Age: 54
End: 2019-02-12

## 2019-02-12 NOTE — TELEPHONE ENCOUNTER
Panel Management Review      Patient has the following on her problem list: None      Composite cancer screening  Chart review shows that this patient is due/due soon for the following Fecal Colorectal (FIT)  Summary:    Patient is due/failing the following:   FIT    Action needed:   Pt has fit test just needs to collect and mail in. Needs reminder    Type of outreach:    Phone, left message for patient to call back.     Questions for provider review:    None                                                                                                                                    Earl Stock [facility]           Chart routed to Care Team .

## 2019-02-12 NOTE — LETTER
February 27, 2019      [place] [address]        Dear Gilma,     Our records indicate that you may be due for preventative health care services.  Please make an appointment or call to set up the following tests as recommended.  If you have already had this testing done at another clinic please contact us to help us update our records to reflect the preventative care that you have had.    Colon cancer screen (colonoscopy) - Recommended every ten years for everyone age 50 and older. Screening is done by a colonoscopy every 10 years starting at age 50 or earlier if you have a family history of colon cancer.  If you cannot or do not want to have a colonoscopy you can opt to do an annual fecal occult blood immunoassay test (FIT stool test). We strongly urge our patients to consider having a colonoscopy done, which is the best screening test available and only needs to be done every 10 years if normal.                                                                                                                                               Thank you for choosing [facility]. We appreciate the opportunity to serve you and look forward to supporting your healthcare needs in the future.    If you have any questions or concerns, please contact us at [phone].      Sincerely,          Chyna Blunt MD

## 2019-03-08 DIAGNOSIS — E03.9 ACQUIRED HYPOTHYROIDISM: ICD-10-CM

## 2019-03-08 NOTE — TELEPHONE ENCOUNTER
"Requested Prescriptions   Pending Prescriptions Disp Refills     levothyroxine (SYNTHROID/LEVOTHROID) 75 MCG tablet 90 tablet 3    Last Written Prescription Date:  03/21/2018  Last Fill Quantity: 90 tablet,  # refills: 3   Last office visit: 7/23/2018 with prescribing provider:  07/23/2018   Future Office Visit:     Sig: Take 1 tablet (75 mcg) by mouth daily    Thyroid Protocol Failed - 3/8/2019  2:15 PM       Failed - Normal TSH on file in past 12 months    Recent Labs   Lab Test 12/21/17  1721   TSH 1.72             Passed - Patient is 12 years or older       Passed - Recent (12 mo) or future (30 days) visit within the authorizing provider's specialty    Patient had office visit in the last 12 months or has a visit in the next 30 days with authorizing provider or within the authorizing provider's specialty.  See \"Patient Info\" tab in inbasket, or \"Choose Columns\" in Meds & Orders section of the refill encounter.             Passed - Medication is active on med list       Passed - No active pregnancy on record    If patient is pregnant or has had a positive pregnancy test, please check TSH.         Passed - No positive pregnancy test in past 12 months    If patient is pregnant or has had a positive pregnancy test, please check TSH.          Sunny Wallace XRT  "

## 2019-03-13 RX ORDER — LEVOTHYROXINE SODIUM 75 UG/1
75 TABLET ORAL DAILY
Qty: 30 TABLET | Refills: 0 | Status: SHIPPED | OUTPATIENT
Start: 2019-03-13 | End: 2019-04-22

## 2019-03-13 NOTE — TELEPHONE ENCOUNTER
Routing refill request to provider for review/approval because:  Patient needs to be seen because:  Needs OV and labs but not responding to phone calls or mychart  Hesham Jeronimo RN, BSN

## 2019-03-20 DIAGNOSIS — E03.9 ACQUIRED HYPOTHYROIDISM: ICD-10-CM

## 2019-03-20 RX ORDER — LEVOTHYROXINE SODIUM 75 UG/1
75 TABLET ORAL DAILY
Qty: 30 TABLET | Refills: 0 | OUTPATIENT
Start: 2019-03-20

## 2019-03-20 NOTE — TELEPHONE ENCOUNTER
"Requested Prescriptions   Pending Prescriptions Disp Refills     levothyroxine (SYNTHROID/LEVOTHROID) 75 MCG tablet 30 tablet 0    Last Written Prescription Date:  03/13/2019  Last Fill Quantity: 30 tablet,  # refills: 0   Last office visit: 7/23/2018 with prescribing provider:  07/23/2018   Future Office Visit:     Sig: Take 1 tablet (75 mcg) by mouth daily    Thyroid Protocol Failed - 3/20/2019  7:43 AM       Failed - Normal TSH on file in past 12 months    Recent Labs   Lab Test 12/21/17  1721   TSH 1.72             Passed - Patient is 12 years or older       Passed - Recent (12 mo) or future (30 days) visit within the authorizing provider's specialty    Patient had office visit in the last 12 months or has a visit in the next 30 days with authorizing provider or within the authorizing provider's specialty.  See \"Patient Info\" tab in inbasket, or \"Choose Columns\" in Meds & Orders section of the refill encounter.             Passed - Medication is active on med list       Passed - No active pregnancy on record    If patient is pregnant or has had a positive pregnancy test, please check TSH.         Passed - No positive pregnancy test in past 12 months    If patient is pregnant or has had a positive pregnancy test, please check TSH.          Sunny Wallace XRT  "

## 2019-04-18 ENCOUNTER — OFFICE VISIT (OUTPATIENT)
Dept: FAMILY MEDICINE | Facility: CLINIC | Age: 54
End: 2019-04-18
Payer: COMMERCIAL

## 2019-04-18 VITALS
SYSTOLIC BLOOD PRESSURE: 120 MMHG | WEIGHT: 222 LBS | HEART RATE: 73 BPM | TEMPERATURE: 98 F | RESPIRATION RATE: 14 BRPM | HEIGHT: 67 IN | DIASTOLIC BLOOD PRESSURE: 78 MMHG | BODY MASS INDEX: 34.84 KG/M2

## 2019-04-18 DIAGNOSIS — Z23 ENCOUNTER FOR IMMUNIZATION: ICD-10-CM

## 2019-04-18 DIAGNOSIS — E03.9 ACQUIRED HYPOTHYROIDISM: Primary | ICD-10-CM

## 2019-04-18 DIAGNOSIS — Z13.1 SCREENING FOR DIABETES MELLITUS: ICD-10-CM

## 2019-04-18 DIAGNOSIS — Z12.11 SPECIAL SCREENING FOR MALIGNANT NEOPLASMS, COLON: ICD-10-CM

## 2019-04-18 DIAGNOSIS — Z13.220 LIPID SCREENING: ICD-10-CM

## 2019-04-18 DIAGNOSIS — Z79.890 HORMONE REPLACEMENT THERAPY: ICD-10-CM

## 2019-04-18 LAB
CHOLEST SERPL-MCNC: 235 MG/DL
HBA1C MFR BLD: 5.5 % (ref 0–5.6)
HDLC SERPL-MCNC: 70 MG/DL
LDLC SERPL CALC-MCNC: 133 MG/DL
NONHDLC SERPL-MCNC: 165 MG/DL
T4 FREE SERPL-MCNC: 1.05 NG/DL (ref 0.76–1.46)
TRIGL SERPL-MCNC: 160 MG/DL
TSH SERPL DL<=0.005 MIU/L-ACNC: 1.15 MU/L (ref 0.4–4)

## 2019-04-18 PROCEDURE — 80061 LIPID PANEL: CPT | Performed by: FAMILY MEDICINE

## 2019-04-18 PROCEDURE — 83036 HEMOGLOBIN GLYCOSYLATED A1C: CPT | Performed by: FAMILY MEDICINE

## 2019-04-18 PROCEDURE — 90472 IMMUNIZATION ADMIN EACH ADD: CPT | Performed by: FAMILY MEDICINE

## 2019-04-18 PROCEDURE — 84439 ASSAY OF FREE THYROXINE: CPT | Performed by: FAMILY MEDICINE

## 2019-04-18 PROCEDURE — 99213 OFFICE O/P EST LOW 20 MIN: CPT | Mod: 25 | Performed by: FAMILY MEDICINE

## 2019-04-18 PROCEDURE — 90471 IMMUNIZATION ADMIN: CPT | Performed by: FAMILY MEDICINE

## 2019-04-18 PROCEDURE — 90750 HZV VACC RECOMBINANT IM: CPT | Performed by: FAMILY MEDICINE

## 2019-04-18 PROCEDURE — 90715 TDAP VACCINE 7 YRS/> IM: CPT | Performed by: FAMILY MEDICINE

## 2019-04-18 PROCEDURE — 84443 ASSAY THYROID STIM HORMONE: CPT | Performed by: FAMILY MEDICINE

## 2019-04-18 PROCEDURE — 36415 COLL VENOUS BLD VENIPUNCTURE: CPT | Performed by: FAMILY MEDICINE

## 2019-04-18 ASSESSMENT — MIFFLIN-ST. JEOR: SCORE: 1638.58

## 2019-04-18 NOTE — PROGRESS NOTES
"  SUBJECTIVE:   Gilma Hargrove is a 53 year old female who presents to clinic today for the following   health issues:    Medication Followup of levothyroxine    Taking Medication as prescribed: yes    Side Effects:  None    Medication Helping Symptoms:  yes       Taking 75 mcg daily, feels this dose is working well for her. Due for lab work today.     Would like to continue HRT for now, had a hard time transitioning to a lower dose, unsure she could stop due to recurrence of menopausal symptoms. She is aware of the risks of HRT use.       Health maintenance- tdap pending, colonoscopy due        Additional history: as documented    Reviewed  and updated as needed this visit by clinical staff  Allergies         Reviewed and updated as needed this visit by Provider         Patient Active Problem List   Diagnosis     Acquired hypothyroidism     S/P hysterectomy with oophorectomy     Arthritis of right hip     Hormone replacement therapy     Past Surgical History:   Procedure Laterality Date     HYSTERECTOMY, PAP NO LONGER INDICATED         Social History     Tobacco Use     Smoking status: Never Smoker     Smokeless tobacco: Never Used   Substance Use Topics     Alcohol use: Yes     Comment: occ     Family History   Problem Relation Age of Onset     Thyroid Cancer Mother      Other Cancer Father         pancrease     Arthritis Father            ROS:  Constitutional, HEENT, cardiovascular, pulmonary, gi and gu systems are negative, except as otherwise noted.    OBJECTIVE:     /78 (BP Location: Right arm, Patient Position: Sitting, Cuff Size: Adult Large)   Pulse 73   Temp 98  F (36.7  C) (Oral)   Resp 14   Ht 1.708 m (5' 7.25\")   Wt 100.7 kg (222 lb)   Breastfeeding? No   BMI 34.51 kg/m    Body mass index is 34.51 kg/m .  GENERAL: healthy, alert and no distress  NECK: no adenopathy, no asymmetry, masses, or scars and thyroid normal to palpation  RESP: lungs clear to auscultation - no rales, rhonchi or " wheezes  CV: regular rate and rhythm, normal S1 S2, no S3 or S4, no murmur, click or rub, no peripheral edema and peripheral pulses strong  PSYCH: mentation appears normal, affect normal/bright    Diagnostic Test Results:  none     ASSESSMENT/PLAN:     1. Acquired hypothyroidism - will check levels today  - TSH  - T4, free    2. Hormone replacement therapy - reviewed risks again, she will continue for now, will readdress next year, advised yearly mammogram    3. Special screening for malignant neoplasms, colon  - GASTROENTEROLOGY ADULT REF PROCEDURE ONLY Sia Millerge [phone]; [facility]    4. Encounter for immunization  - TDAP, IM (10 - 64 YRS) - Adacel  - ADMIN 1st VACCINE  - ADMIN 1st VACCINE    5. Screening for diabetes mellitus  - Hemoglobin A1c    6. Lipid screening  - Lipid panel reflex to direct LDL Fasting      Chyna Blunt MD  [facility]

## 2019-04-18 NOTE — NURSING NOTE
Screening Questionnaire for Adult Immunization    Are you sick today?   No   Do you have allergies to medications, food, a vaccine component or latex?   No   Have you ever had a serious reaction after receiving a vaccination?   No   Do you have a long-term health problem with heart disease, lung disease, asthma, kidney disease, metabolic disease (e.g. diabetes), anemia, or other blood disorder?   No   Do you have cancer, leukemia, HIV/AIDS, or any other immune system problem?   No   In the past 3 months, have you taken medications that affect  your immune system, such as prednisone, other steroids, or anticancer drugs; drugs for the treatment of rheumatoid arthritis, Crohn s disease, or psoriasis; or have you had radiation treatments?   No   Have you had a seizure, or a brain or other nervous system problem?   No   During the past year, have you received a transfusion of blood or blood     products, or been given immune (gamma) globulin or antiviral drug?   No   For women: Are you pregnant or is there a chance you could become        pregnant during the next month?   No   Have you received any vaccinations in the past 4 weeks?   No     Immunization questionnaire answers were all negative.        Per orders of Dr. mcclain, injection of shingrix, tdap given by Charly Quintero. Patient instructed to remain in clinic for 15 minutes afterwards, and to report any adverse reaction to me immediately.       Screening performed by Charly Quintreo on 4/18/2019 at 8:50 AM.

## 2019-04-22 DIAGNOSIS — E03.9 ACQUIRED HYPOTHYROIDISM: ICD-10-CM

## 2019-04-22 RX ORDER — LEVOTHYROXINE SODIUM 75 UG/1
75 TABLET ORAL DAILY
Qty: 90 TABLET | Refills: 3 | Status: SHIPPED | OUTPATIENT
Start: 2019-04-22 | End: 2020-03-28

## 2019-04-22 NOTE — TELEPHONE ENCOUNTER
Prescription approved per [facility] Refill Protocol.  Hesham Jeronimo RN, BSN

## 2019-04-22 NOTE — TELEPHONE ENCOUNTER
Gilma asked [facility] pharmacy to transfer levothyroxine from [facility], per call there are no fills to transfer, please send new order, order pended for #30 +0 refills,thanks!    Monse Palmer, PharmD    [facility]  Phone:  [phone]  Fax:  [phone]

## 2019-06-21 ENCOUNTER — ALLIED HEALTH/NURSE VISIT (OUTPATIENT)
Dept: NURSING | Facility: CLINIC | Age: 54
End: 2019-06-21
Payer: COMMERCIAL

## 2019-06-21 DIAGNOSIS — Z23 ENCOUNTER FOR IMMUNIZATION: Primary | ICD-10-CM

## 2019-06-21 PROCEDURE — 90750 HZV VACC RECOMBINANT IM: CPT

## 2019-06-21 PROCEDURE — 90471 IMMUNIZATION ADMIN: CPT

## 2019-10-04 ENCOUNTER — HEALTH MAINTENANCE LETTER (OUTPATIENT)
Age: 54
End: 2019-10-04

## 2019-10-31 ENCOUNTER — OFFICE VISIT (OUTPATIENT)
Dept: FAMILY MEDICINE | Facility: CLINIC | Age: 54
End: 2019-10-31
Payer: COMMERCIAL

## 2019-10-31 VITALS
SYSTOLIC BLOOD PRESSURE: 133 MMHG | RESPIRATION RATE: 14 BRPM | WEIGHT: 222 LBS | HEART RATE: 73 BPM | BODY MASS INDEX: 34.84 KG/M2 | DIASTOLIC BLOOD PRESSURE: 86 MMHG | HEIGHT: 67 IN | TEMPERATURE: 97.8 F

## 2019-10-31 DIAGNOSIS — Z12.11 SPECIAL SCREENING FOR MALIGNANT NEOPLASMS, COLON: ICD-10-CM

## 2019-10-31 DIAGNOSIS — Z12.31 ENCOUNTER FOR SCREENING MAMMOGRAM FOR BREAST CANCER: ICD-10-CM

## 2019-10-31 DIAGNOSIS — Z90.710 S/P HYSTERECTOMY WITH OOPHORECTOMY: ICD-10-CM

## 2019-10-31 DIAGNOSIS — Z90.721 S/P HYSTERECTOMY WITH OOPHORECTOMY: ICD-10-CM

## 2019-10-31 DIAGNOSIS — E03.9 ACQUIRED HYPOTHYROIDISM: Primary | ICD-10-CM

## 2019-10-31 PROCEDURE — 99213 OFFICE O/P EST LOW 20 MIN: CPT | Performed by: FAMILY MEDICINE

## 2019-10-31 PROCEDURE — 84443 ASSAY THYROID STIM HORMONE: CPT | Performed by: FAMILY MEDICINE

## 2019-10-31 PROCEDURE — 36415 COLL VENOUS BLD VENIPUNCTURE: CPT | Performed by: FAMILY MEDICINE

## 2019-10-31 PROCEDURE — 84439 ASSAY OF FREE THYROXINE: CPT | Performed by: FAMILY MEDICINE

## 2019-10-31 RX ORDER — ESTRADIOL 0.5 MG/1
0.5 TABLET ORAL DAILY
Qty: 90 TABLET | Refills: 3 | Status: SHIPPED | OUTPATIENT
Start: 2019-10-31 | End: 2020-09-25

## 2019-10-31 ASSESSMENT — MIFFLIN-ST. JEOR: SCORE: 1638.58

## 2019-10-31 NOTE — PROGRESS NOTES
"Subjective     Gilma Hargrove is a 53 year old female who presents to clinic today for the following health issues:    HPI   Medication Followup of Levothyroxine      Taking Medication as prescribed: yes    Side Effects:  None    Medication Helping Symptoms:  yes       Taking 75 mcg levothyroxine daily.  Reports no side effects.  Feels that this is a good dose for her.    Is due for her annual mammogram.  Is taking hormone replacement therapy and feels like she wants to continue it at this point.    Has not yet had a colonoscopy as her dad fell ill this past summer and she was unable to get the time away to have this done.  She plans to have it done by the end of 2019.       Patient Active Problem List   Diagnosis     Acquired hypothyroidism     S/P hysterectomy with oophorectomy     Arthritis of right hip     Hormone replacement therapy     Past Surgical History:   Procedure Laterality Date     HYSTERECTOMY, PAP NO LONGER INDICATED       HYSTERECTOMY, PAP NO LONGER INDICATED         Social History     Tobacco Use     Smoking status: Never Smoker     Smokeless tobacco: Never Used   Substance Use Topics     Alcohol use: Yes     Comment: occ     Family History   Problem Relation Age of Onset     Thyroid Cancer Mother      Other Cancer Father         pancrease     Arthritis Father          Reviewed and updated as needed this visit by Provider  Meds         Review of Systems   ROS COMP: Constitutional, HEENT, cardiovascular, pulmonary, gi and gu systems are negative, except as otherwise noted.      Objective    /86 (BP Location: Right arm, Patient Position: Sitting, Cuff Size: Adult Large)   Pulse 73   Temp 97.8  F (36.6  C) (Oral)   Resp 14   Ht 1.708 m (5' 7.25\")   Wt 100.7 kg (222 lb)   Breastfeeding? No   BMI 34.51 kg/m    Body mass index is 34.51 kg/m .  Physical Exam   GENERAL: healthy, alert and no distress  PSYCH: mentation appears normal, affect normal/bright    Diagnostic Test Results:  Labs reviewed " in Epic        Assessment & Plan     1. Acquired hypothyroidism -surveillance labs today, she already has refills.  - TSH  - T4, free    2. Encounter for screening mammogram for breast cancer  - MA Screen Bilateral w/Logan; Future    3. Special screening for malignant neoplasms, colon  - GASTROENTEROLOGY ADULT REF PROCEDURE ONLY Sia Meléndez [phone]; [facility]    4. S/P hysterectomy with oophorectomy -reviewed risks of hormone replacement therapy once again, she will have her annual mammogram, refills  - estradiol (ESTRACE) 0.5 MG tablet; Take 1 tablet (0.5 mg) by mouth daily  Dispense: 90 tablet; Refill: 3     Return in about 6 months (around 4/30/2020) for Lab Work.    Chyna Blunt MD  [facility]

## 2019-11-01 LAB
T4 FREE SERPL-MCNC: 0.99 NG/DL (ref 0.76–1.46)
TSH SERPL DL<=0.005 MIU/L-ACNC: 1.77 MU/L (ref 0.4–4)

## 2019-11-22 ENCOUNTER — HOSPITAL ENCOUNTER (OUTPATIENT)
Dept: MAMMOGRAPHY | Facility: CLINIC | Age: 54
Discharge: HOME | End: 2019-11-22
Attending: FAMILY MEDICINE | Admitting: FAMILY MEDICINE
Payer: COMMERCIAL

## 2019-11-22 DIAGNOSIS — Z12.31 ENCOUNTER FOR SCREENING MAMMOGRAM FOR BREAST CANCER: ICD-10-CM

## 2019-11-22 PROCEDURE — 77063 BREAST TOMOSYNTHESIS BI: CPT

## 2020-03-28 ENCOUNTER — MYC REFILL (OUTPATIENT)
Dept: FAMILY MEDICINE | Facility: CLINIC | Age: 55
End: 2020-03-28

## 2020-03-28 DIAGNOSIS — E03.9 ACQUIRED HYPOTHYROIDISM: ICD-10-CM

## 2020-03-30 RX ORDER — LEVOTHYROXINE SODIUM 75 UG/1
75 TABLET ORAL DAILY
Qty: 90 TABLET | Refills: 1 | Status: SHIPPED | OUTPATIENT
Start: 2020-03-30 | End: 2020-10-06

## 2020-03-30 NOTE — TELEPHONE ENCOUNTER
Prescription approved per [facility] Refill Protocol.  Hesham Jeronimo RN, BSN

## 2020-09-25 ENCOUNTER — VIRTUAL VISIT (OUTPATIENT)
Dept: FAMILY MEDICINE | Facility: CLINIC | Age: 55
End: 2020-09-25
Payer: COMMERCIAL

## 2020-09-25 VITALS — DIASTOLIC BLOOD PRESSURE: 78 MMHG | SYSTOLIC BLOOD PRESSURE: 113 MMHG

## 2020-09-25 DIAGNOSIS — Z90.721 S/P HYSTERECTOMY WITH OOPHORECTOMY: ICD-10-CM

## 2020-09-25 DIAGNOSIS — Z13.1 SCREENING FOR DIABETES MELLITUS: ICD-10-CM

## 2020-09-25 DIAGNOSIS — Z90.710 S/P HYSTERECTOMY WITH OOPHORECTOMY: ICD-10-CM

## 2020-09-25 DIAGNOSIS — F43.23 ADJUSTMENT DISORDER WITH MIXED ANXIETY AND DEPRESSED MOOD: ICD-10-CM

## 2020-09-25 DIAGNOSIS — Z12.31 ENCOUNTER FOR SCREENING MAMMOGRAM FOR BREAST CANCER: ICD-10-CM

## 2020-09-25 DIAGNOSIS — Z13.220 SCREENING CHOLESTEROL LEVEL: ICD-10-CM

## 2020-09-25 DIAGNOSIS — E03.9 ACQUIRED HYPOTHYROIDISM: Primary | ICD-10-CM

## 2020-09-25 PROCEDURE — 99214 OFFICE O/P EST MOD 30 MIN: CPT | Mod: GT | Performed by: FAMILY MEDICINE

## 2020-09-25 RX ORDER — ESTRADIOL 0.5 MG/1
0.5 TABLET ORAL DAILY
Qty: 90 TABLET | Refills: 3 | Status: SHIPPED | OUTPATIENT
Start: 2020-09-25 | End: 2021-10-29

## 2020-09-25 ASSESSMENT — ANXIETY QUESTIONNAIRES
4. TROUBLE RELAXING: MORE THAN HALF THE DAYS
2. NOT BEING ABLE TO STOP OR CONTROL WORRYING: SEVERAL DAYS
7. FEELING AFRAID AS IF SOMETHING AWFUL MIGHT HAPPEN: NOT AT ALL
GAD7 TOTAL SCORE: 5
5. BEING SO RESTLESS THAT IT IS HARD TO SIT STILL: NOT AT ALL
3. WORRYING TOO MUCH ABOUT DIFFERENT THINGS: SEVERAL DAYS
7. FEELING AFRAID AS IF SOMETHING AWFUL MIGHT HAPPEN: NOT AT ALL
1. FEELING NERVOUS, ANXIOUS, OR ON EDGE: SEVERAL DAYS
6. BECOMING EASILY ANNOYED OR IRRITABLE: NOT AT ALL
GAD7 TOTAL SCORE: 5
GAD7 TOTAL SCORE: 5

## 2020-09-25 ASSESSMENT — PATIENT HEALTH QUESTIONNAIRE - PHQ9
10. IF YOU CHECKED OFF ANY PROBLEMS, HOW DIFFICULT HAVE THESE PROBLEMS MADE IT FOR YOU TO DO YOUR WORK, TAKE CARE OF THINGS AT HOME, OR GET ALONG WITH OTHER PEOPLE: SOMEWHAT DIFFICULT
SUM OF ALL RESPONSES TO PHQ QUESTIONS 1-9: 10
SUM OF ALL RESPONSES TO PHQ QUESTIONS 1-9: 10

## 2020-09-25 NOTE — PROGRESS NOTES
"Gilma Hargrove is a 54 year old female who is being evaluated via a billable video visit.      The patient has been notified of following:     \"This video visit will be conducted via a call between you and your physician/provider. We have found that certain health care needs can be provided without the need for an in-person physical exam.  This service lets us provide the care you need with a video conversation.  If a prescription is necessary we can send it directly to your pharmacy.  If lab work is needed we can place an order for that and you can then stop by our lab to have the test done at a later time.    Video visits are billed at different rates depending on your insurance coverage.  Please reach out to your insurance provider with any questions.    If during the course of the call the physician/provider feels a video visit is not appropriate, you will not be charged for this service.\"    Patient has given verbal consent for Video visit? Yes  How would you like to obtain your AVS? MyChart  If you are dropped from the video visit, the video invite should be resent to: Text to cell phone: [phone]  Will anyone else be joining your video visit? No      Subjective     Gilma Hargrove is a 54 year old female who presents today via video visit for the following health issues:    HPI    Hypothyroidism Follow-up      Since last visit, patient describes the following symptoms: Weight stable, no hair loss, no skin changes, no constipation, no loose stools    Answers for HPI/ROS submitted by the patient on 9/25/2020   Chronic problems general questions HPI Form  How many servings of fruits and vegetables do you eat daily?: 2-3  On average, how many sweetened beverages do you drink each day (Examples: soda, juice, sweet tea, etc.  Do NOT count diet or artificially sweetened beverages)?: 0  How many minutes a day do you exercise enough to make your heart beat faster?: 10 to 19  How many days a week do you exercise enough to " make your heart beat faster?: 3 or less  How many days per week do you miss taking your medication?: 0  Depression/Anxiety: Depression & Anxiety  Status since last visit:: worse  Anxiety since last: : worse  Other associated symptoms of depression:: Yes  Other associated symotome: : Yes  Significant life event: : financial concerns, grief or loss, health concerns  Anxious:: No  Current substance use:: No  If you checked off any problems, how difficult have these problems made it for you to do your work, take care of things at home, or get along with other people?: Somewhat difficult  PHQ9 TOTAL SCORE: 10  FAYE 7 TOTAL SCORE: 5      Admits to intentional weight gain over the past several months.     Mom  unexpectedly this summer. Dad  earlier this year.     She has been stress and emotional eating.     Interested in meeting with therapist.     Due for lab work.       Video Start Time: 1:28 PM      Review of Systems   Constitutional, HEENT, cardiovascular, pulmonary, gi and gu systems are negative, except as otherwise noted.      Objective           Vitals:  No vitals were obtained today due to virtual visit.  BP Readings from Last 3 Encounters:   20 113/78   10/31/19 133/86   19 120/78       Physical Exam     GENERAL: Healthy, alert and no distress  EYES: Eyes grossly normal to inspection.  No discharge or erythema, or obvious scleral/conjunctival abnormalities.  RESP: No audible wheeze, cough, or visible cyanosis.  No visible retractions or increased work of breathing.    SKIN: Visible skin clear. No significant rash, abnormal pigmentation or lesions.  NEURO: Cranial nerves grossly intact.  Mentation and speech appropriate for age.  PSYCH: Mentation appears normal, affect normal/bright, judgement and insight intact, normal speech and appearance well-groomed.            Assessment & Plan     1. Acquired hypothyroidism - surveillance labs, refills based on result  - **TSH with free T4 reflex FUTURE  "anytime; Future    2. Adjustment disorder with mixed anxiety and depressed mood - suggested therapy consult through [facility], she agrees  - MENTAL HEALTH REFERRAL  - Adult; Outpatient Treatment; Individual/Couples/Family/Group Therapy/Health Psychology; [facility]: [facility] [phone]; We will contact you to schedule the appointment or please call with any questions    3. Encounter for screening mammogram for breast cancer   - MA Screen Bilateral w/Logan; Future    4. S/P hysterectomy with oophorectomy - stable symptoms, refills  - estradiol (ESTRACE) 0.5 MG tablet; Take 1 tablet (0.5 mg) by mouth daily  Dispense: 90 tablet; Refill: 3    5. Screening cholesterol level  - Lipid panel reflex to direct LDL Fasting; Future    6. Screening for diabetes mellitus  - **A1C FUTURE anytime; Future       BMI:   Estimated body mass index is 34.51 kg/m  as calculated from the following:    Height as of 10/31/19: 1.708 m (5' 7.25\").    Weight as of 10/31/19: 100.7 kg (222 lb).       Depression Screening Follow Up    PHQ 9/25/2020   PHQ-9 Total Score 10   Q9: Thoughts of better off dead/self-harm past 2 weeks Not at all         Follow Up Actions Taken  Crisis resource information provided in After Visit Summary  Mental Health Referral placed     Return in about 1 year (around 9/25/2021) for Medication Recheck.    Chyna Blunt MD  [facility]      Video-Visit Details    Type of service:  Video Visit    Video End Time:1:44 PM    Originating Location (pt. Location): Home    Distant Location (provider location):  [facility]     Platform used for Video Visit: Trip"

## 2020-09-26 ASSESSMENT — ANXIETY QUESTIONNAIRES: GAD7 TOTAL SCORE: 5

## 2020-09-26 ASSESSMENT — PATIENT HEALTH QUESTIONNAIRE - PHQ9: SUM OF ALL RESPONSES TO PHQ QUESTIONS 1-9: 10

## 2020-10-06 DIAGNOSIS — E03.9 ACQUIRED HYPOTHYROIDISM: ICD-10-CM

## 2020-10-06 RX ORDER — LEVOTHYROXINE SODIUM 75 UG/1
75 TABLET ORAL DAILY
Qty: 90 TABLET | Refills: 0 | Status: SHIPPED | OUTPATIENT
Start: 2020-10-06 | End: 2021-01-11

## 2020-10-06 NOTE — TELEPHONE ENCOUNTER
Medication is being filled for 1 time refill only due to:  Patient needs labs before next refill.    Pt has labs ordered  Hesham Jeronimo RN, BSN

## 2020-10-23 DIAGNOSIS — Z13.220 SCREENING CHOLESTEROL LEVEL: ICD-10-CM

## 2020-10-23 DIAGNOSIS — Z13.1 SCREENING FOR DIABETES MELLITUS: ICD-10-CM

## 2020-10-23 DIAGNOSIS — E03.9 ACQUIRED HYPOTHYROIDISM: ICD-10-CM

## 2020-10-23 LAB
CHOLEST SERPL-MCNC: 227 MG/DL
HBA1C MFR BLD: 5.2 % (ref 0–5.6)
HDLC SERPL-MCNC: 61 MG/DL
LDLC SERPL CALC-MCNC: 132 MG/DL
NONHDLC SERPL-MCNC: 166 MG/DL
TRIGL SERPL-MCNC: 172 MG/DL
TSH SERPL DL<=0.005 MIU/L-ACNC: 3.97 MU/L (ref 0.4–4)

## 2020-10-23 PROCEDURE — 84443 ASSAY THYROID STIM HORMONE: CPT | Performed by: FAMILY MEDICINE

## 2020-10-23 PROCEDURE — 36415 COLL VENOUS BLD VENIPUNCTURE: CPT | Performed by: FAMILY MEDICINE

## 2020-10-23 PROCEDURE — 80061 LIPID PANEL: CPT | Performed by: FAMILY MEDICINE

## 2020-10-23 PROCEDURE — 83036 HEMOGLOBIN GLYCOSYLATED A1C: CPT | Performed by: FAMILY MEDICINE

## 2020-11-07 ENCOUNTER — HEALTH MAINTENANCE LETTER (OUTPATIENT)
Age: 55
End: 2020-11-07

## 2020-12-10 ENCOUNTER — VIRTUAL VISIT (OUTPATIENT)
Dept: PSYCHOLOGY | Facility: CLINIC | Age: 55
End: 2020-12-10
Attending: FAMILY MEDICINE
Payer: COMMERCIAL

## 2020-12-10 DIAGNOSIS — F33.9 MAJOR DEPRESSIVE DISORDER, RECURRENT EPISODE WITH ANXIOUS DISTRESS (H): Primary | ICD-10-CM

## 2020-12-10 PROCEDURE — 99207 PR NO BILLABLE SERVICE THIS VISIT: CPT | Performed by: SOCIAL WORKER

## 2020-12-10 ASSESSMENT — COLUMBIA-SUICIDE SEVERITY RATING SCALE - C-SSRS
6. HAVE YOU EVER DONE ANYTHING, STARTED TO DO ANYTHING, OR PREPARED TO DO ANYTHING TO END YOUR LIFE?: NO
ATTEMPT PAST THREE MONTHS: NO
4. HAVE YOU HAD THESE THOUGHTS AND HAD SOME INTENTION OF ACTING ON THEM?: NO
TOTAL  NUMBER OF ABORTED OR SELF INTERRUPTED ATTEMPTS PAST LIFETIME: NO
TOTAL  NUMBER OF ABORTED OR SELF INTERRUPTED ATTEMPTS PAST 3 MONTHS: NO
5. HAVE YOU STARTED TO WORK OUT OR WORKED OUT THE DETAILS OF HOW TO KILL YOURSELF? DO YOU INTEND TO CARRY OUT THIS PLAN?: NO
6. HAVE YOU EVER DONE ANYTHING, STARTED TO DO ANYTHING, OR PREPARED TO DO ANYTHING TO END YOUR LIFE?: NO
5. HAVE YOU STARTED TO WORK OUT OR WORKED OUT THE DETAILS OF HOW TO KILL YOURSELF? DO YOU INTEND TO CARRY OUT THIS PLAN?: NO
ATTEMPT LIFETIME: NO
1. IN THE PAST MONTH, HAVE YOU WISHED YOU WERE DEAD OR WISHED YOU COULD GO TO SLEEP AND NOT WAKE UP?: NO
1. IN THE PAST MONTH, HAVE YOU WISHED YOU WERE DEAD OR WISHED YOU COULD GO TO SLEEP AND NOT WAKE UP?: NO
4. HAVE YOU HAD THESE THOUGHTS AND HAD SOME INTENTION OF ACTING ON THEM?: NO
3. HAVE YOU BEEN THINKING ABOUT HOW YOU MIGHT KILL YOURSELF?: NO
TOTAL  NUMBER OF INTERRUPTED ATTEMPTS PAST 3 MONTHS: NO
2. HAVE YOU ACTUALLY HAD ANY THOUGHTS OF KILLING YOURSELF LIFETIME?: NO
TOTAL  NUMBER OF INTERRUPTED ATTEMPTS LIFETIME: NO
2. HAVE YOU ACTUALLY HAD ANY THOUGHTS OF KILLING YOURSELF?: NO

## 2020-12-10 NOTE — PROGRESS NOTES
"[facility]                       Progress Note - Initial Visit    Client Name:  Gilma Hargrove Date: 12/10/20         Service Type: Individual     Visit Start Time: 8:05am  Visit End Time: 9:00am    Visit #: 1    Attendees: Client attended alone    Service Modality:  Phone Visit:      Provider verified identity through the following two step process.  Patient provided:  Patient     The patient has been notified of the following:      \"We have found that certain health care needs can be provided without the need for a face to face visit.  This service lets us provide the care you need with a phone conversation.       I will have full access to your [facility] medical record during this entire phone call.   I will be taking notes for your medical record.      Since this is like an office visit, we will bill your insurance company for this service.       There are potential benefits and risks of telephone visits (e.g. limits to patient confidentiality) that differ from in-person visits.?  Confidentiality still applies for telephone services, and nobody will record the visit.  It is important to be in a quiet, private space that is free of distractions (including cell phone or other devices) during the visit.??      If during the course of the call I believe a telephone visit is not appropriate, you will not be charged for this service\"     Consent has been obtained for this service by care team member: Yes        DATA:   Interactive Complexity: No   Crisis: No     Presenting Concerns/  Current Stressors:   Grief at loss of parents and a pet this year   Covid-19 pandemic-adjustment to changes at work and social distancing   Financial stress   Overeating       ASSESSMENT:  Mental Status Assessment:  Appearance:   unable to assess due to phone visit    Eye Contact:   unable to assess   Psychomotor Behavior: unable to assess   Attitude:   Cooperative  Pleasant  Orientation:   All  Speech   Rate / " Production: Normal/ Responsive   Volume:  Normal   Mood:    Normal during session   Affect:    unable to assess   Thought Content:  Clear however also overwhelmed  Thought Form:  Coherent   Insight:    Good       Safety Issues and Plan for Safety and Risk Management:     [place] Suicide Severity Rating Scale (Lifetime/Recent)  [place] Suicide Severity Rating (Lifetime/Recent) 12/10/2020   1. Wish to be Dead (Lifetime) No   1. Wish to be Dead (Recent) No   2. Non-Specific Active Suicidal Thoughts (Lifetime) No   2. Non-Specific Active Suicidal Thoughts (Recent) No   3. Active Suicidal Ideation with any Methods (Not Plan) Without Intent to Act (Lifetime) No   3. Active Sucidal Ideation with any Methods (Not Plan) Without Intent to Act (Recent) No   4. Active Suicidal Ideation with Some Intent to Act, Without Specific Plan (Lifetime) No   4. Active Suicidal Ideation with Some Intent to Act, Without Specific Plan (Recent) No   5. Active Suicidal Ideation with Specific Plan and Intent (Lifetime) No   5. Active Suicidal Ideation with Specific Plan and Intent (Recent) No   Most Severe Ideation Rating (Lifetime) NA   Frequency (Lifetime) NA   Duration (Lifetime) NA   Controllability (Lifetime) NA   Protective Factors  (Lifetime) NA   Reasons for Ideation (Lifetime) NA   Most Severe Ideation Rating (Past Month) NA   Frequency (Past Month) NA   Duration (Past Month) NA   Controllability (Past Month) NA   Protective Factors (Past Month) NA   Reasons for Ideation (Past Month) NA   Actual Attempt (Lifetime) No   Actual Attempt (Past 3 Months) No   Has subject engaged in non-suicidal self-injurious behavior? (Lifetime) No   Has subject engaged in non-suicidal self-injurious behavior? (Past 3 Months) No   Interrupted Attempts (Lifetime) No   Interrupted Attempts (Past 3 Months) No   Aborted or Self-Interrupted Attempt (Lifetime) No   Aborted or Self-Interrupted Attempt (Past 3 Months) No   Preparatory Acts or Behavior (Lifetime)  No   Preparatory Acts or Behavior (Past 3 Months) No   Most Recent Attempt Actual Lethality Code NA   Most Lethal Attempt Actual Lethality Code NA   Initial/First Attempt Actual Lethality Code NA     Patient denies current fears or concerns for personal safety.  Patient denies current or recent suicidal ideation or behaviors.  Patient denies current or recent homicidal ideation or behaviors.  Patient denies current or recent self injurious behavior or ideation.  Patient denies other safety concerns.  Recommended that patient call 911 or go to the local ED should there be a change in any of these risk factors.  Patient reports there are firearms in the house. The firearms are secured in a locked space.     Diagnostic Criteria:  A) Recurrent episode(s) - symptoms have been present during the same 2-week period and represent a change from previous functioning 5 or more symptoms (required for diagnosis)   - Depressed mood. Note: In children and adolescents, can be irritable mood.     - Significant weight gainincrease in appetite.    - Decreased sleep.    - Fatigue or loss of energy.    - Diminished ability to think or concentrate, or indecisiveness.   B) The symptoms cause clinically significant distress or impairment in social, occupational, or other important areas of functioning  C) The episode is not attributable to the physiological effects of a substance or to another medical condition  D) The occurence of major depressive episode is not better explained by other thought / psychotic disorders  E) There has never been a manic episode or hypomanic episode      DSM5 Diagnoses: (Sustained by DSM5 Criteria Listed Above)  Diagnoses: 296.31 (F33.0) Major Depressive Disorder, Recurrent Episode, Mild _ and With anxious distress  Psychosocial & Contextual Factors: Patient is experiencing grief and is adjusting to changes related to the covid-19 pandemic.  She resides with her sisters who are supportive. One of her sisters  "is disabled due to car accidents and she helps care for her.  She went through pre menopause 8 years ago and reports changes in self regulation at that time.   WHODAS 2.0 (12 item):   WHODAS 2.0 Total Score 2020   Total Score 16   Total Score MyChart 16     Intervention:   Educated on treatment planning and started identifying goals and interventions for treatment plan. Completed part of the diagnostic assessment questionnaire.     Collateral Reports Completed:  Not Applicable   Patient consents to treatment.       PLAN: (Homework, other):  1. Provider will continue Diagnostic Assessment.  Patient is encouraged to take time for self care and continue using natural support (siblings).       Magdalenaabe Lopez [facility]  2020        _________________________________________________  Diagnostic assessment-incomplete    Provider Name:  Magdalenaabe Lopez     Credentials:  [facility]    PATIENT'S NAME: Gilma Hargrove  PREFERRED NAME: Gilma  PRONOUNS: she,her   MRN: [id number]  : 1965   ACCT. NUMBER:  [id number]  DATE OF SERVICE: 12/10/20  START TIME:   END TIME:   PREFERRED PHONE: [phone]   May we leave a program related message: Yes  SERVICE MODALITY:  Phone Visit:      Provider verified identity through the following two step process.  Patient provided:  Patient     The patient has been notified of the following:      \"We have found that certain health care needs can be provided without the need for a face to face visit.  This service lets us provide the care you need with a phone conversation.       I will have full access to your [facility] medical record during this entire phone call.   I will be taking notes for your medical record.      Since this is like an office visit, we will bill your insurance company for this service.       There are potential benefits and risks of telephone visits (e.g. limits to patient confidentiality) that differ from in-person visits.?  Confidentiality still applies for " "telephone services, and nobody will record the visit.  It is important to be in a quiet, private space that is free of distractions (including cell phone or other devices) during the visit.??      If during the course of the call I believe a telephone visit is not appropriate, you will not be charged for this service\"     Consent has been obtained for this service by care team member: Yes     [facility]ULT [facility] DIAGNOSTIC ASSESSMENT      Identifying Information:  Patient is a 55 year old,  female. The pronoun use throughout this assessment reflects the patient's chosen pronoun.  Patient was referred for an assessment by primary care provider.  Patient asked for the referral. Patient attended the session alone.     Chief Complaint:   The reason for seeking services at this time is: \" eight years ago I went through premenopause and my self regulation has been off since then.\"   At that time she had a complete hysterectomy. Her father's health declined and she moved back to [place]. Her father passed away in 2020 from cancer and her mother passed away in 2020 unexpectedly (1 week after a diagnosis). Her cat also  this year. Problems related to the Covid-19 pandemic have also caused stress (social distancing, works as a ).  She has concerns regarding managing anxiety, including compulsive eating.  Patient has attempted to resolve these concerns in the past through outpatient therapy in her early 20's in [facility] [place]. She shared this helped control her anxiety.     Social/Family History:  Patient reported they grew up in [place], [place].  They were raised by biological parents.  Parents stayed .   Patient reported that their childhood was pretty good.  Patient described their current relationships with family of origin as good.  She resides with her two sisters.      The patient describes their cultural background as  and she meditates. She " "does not identify with a Hoahaoism.  Cultural influences and impact on patient's life structure, values, norms, and healthcare: Racial or Ethnic Self-Identification Caucasion, resided in various locations throughout the [place].  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    These factors will be addressed in the Preliminary Treatment plan.  Patient identified their preferred language to be English. Patient reported they does not need the assistance of an  or other support involved in therapy.     Patient reported experienced significant delays in developmental tasks, such as talking.  She didn't really start talking until age 1 or 2. She also screamed for 3 months a significant amount, after birth.  She reports being very hard on herself from a young age.   Patient's highest education level was graduate school.  She has 2 masters degrees.  Patient identified the following learning problems: processing. She regularly read dictionaries and encyclopedias because she didn't want to \"feel stupid.\"  She reported that she wasn't as quick in learning as her peers.  Modifications will not be used to assist communication in therapy.   Patient reports they are  able to understand written materials.    Patient reported the following relationship history.  Patient's current relationship status is  since 1995.  She was  for 8.5 years and in that relationship for a total of 15 years.  Patient identified their sexual orientation as:    "

## 2020-12-22 ENCOUNTER — VIRTUAL VISIT (OUTPATIENT)
Dept: PSYCHOLOGY | Facility: CLINIC | Age: 55
End: 2020-12-22
Payer: COMMERCIAL

## 2020-12-22 ENCOUNTER — HOSPITAL ENCOUNTER (OUTPATIENT)
Dept: MAMMOGRAPHY | Facility: CLINIC | Age: 55
Discharge: HOME | End: 2020-12-22
Attending: FAMILY MEDICINE | Admitting: FAMILY MEDICINE
Payer: COMMERCIAL

## 2020-12-22 DIAGNOSIS — F33.9 MAJOR DEPRESSIVE DISORDER, RECURRENT EPISODE WITH ANXIOUS DISTRESS (H): Primary | ICD-10-CM

## 2020-12-22 DIAGNOSIS — Z12.31 ENCOUNTER FOR SCREENING MAMMOGRAM FOR BREAST CANCER: ICD-10-CM

## 2020-12-22 PROCEDURE — 90834 PSYTX W PT 45 MINUTES: CPT | Mod: GT | Performed by: SOCIAL WORKER

## 2020-12-22 PROCEDURE — 77067 SCR MAMMO BI INCL CAD: CPT

## 2020-12-22 NOTE — PROGRESS NOTES
"                  Progress Note - Initial Visit    Client Name:  Gilma Hargrove Date: 12/22/20         Service Type: Individual     Visit Start Time: 11:00am  Visit End Time: 12:00pm    Visit #: 2    Attendees: Client attended alone    Service Modality:  Phone Visit:      Provider verified identity through the following two step process.  Patient provided:  Patient photo/video    The patient has been notified of the following:      \"We have found that certain health care needs can be provided without the need for a face to face visit.  This service lets us provide the care you need with a phone conversation.       I will have full access to your [place] [facility] medical record during this entire phone call.   I will be taking notes for your medical record.      Since this is like an office visit, we will bill your insurance company for this service.       There are potential benefits and risks of telephone visits (e.g. limits to patient confidentiality) that differ from in-person visits.?  Confidentiality still applies for telephone services, and nobody will record the visit.  It is important to be in a quiet, private space that is free of distractions (including cell phone or other devices) during the visit.??      If during the course of the call I believe a telephone visit is not appropriate, you will not be charged for this service\"     Consent has been obtained for this service by care team member: Yes        DATA:   Interactive Complexity: No   Crisis: No     Presenting Concerns/  Current Stressors:   Grief at loss of parents and a pet this year   Covid-19 pandemic-adjustment to changes at work and social distancing   Financial stress   Overeating    Stress regarding returning to work due to pandemic      ASSESSMENT:  Mental Status Assessment:  Appearance:   Appropriate   Eye Contact:   Good   Psychomotor Behavior: Normal   Attitude:   Cooperative  Pleasant  Orientation:   All  Speech   Rate / Production: Normal/ " Responsive   Volume:  Normal   Mood:    Normal during session, expresses anxiety regarding returning to work due to the pandemic   Affect:    Appropriate   Thought Content:  Clear   Thought Form:  Coherent   Insight:    Good       Safety Issues and Plan for Safety and Risk Management:     [place] Suicide Severity Rating Scale (Lifetime/Recent)  [place] Suicide Severity Rating (Lifetime/Recent) 12/10/2020   1. Wish to be Dead (Lifetime) No   1. Wish to be Dead (Recent) No   2. Non-Specific Active Suicidal Thoughts (Lifetime) No   2. Non-Specific Active Suicidal Thoughts (Recent) No   3. Active Suicidal Ideation with any Methods (Not Plan) Without Intent to Act (Lifetime) No   3. Active Sucidal Ideation with any Methods (Not Plan) Without Intent to Act (Recent) No   4. Active Suicidal Ideation with Some Intent to Act, Without Specific Plan (Lifetime) No   4. Active Suicidal Ideation with Some Intent to Act, Without Specific Plan (Recent) No   5. Active Suicidal Ideation with Specific Plan and Intent (Lifetime) No   5. Active Suicidal Ideation with Specific Plan and Intent (Recent) No   Most Severe Ideation Rating (Lifetime) NA   Frequency (Lifetime) NA   Duration (Lifetime) NA   Controllability (Lifetime) NA   Protective Factors  (Lifetime) NA   Reasons for Ideation (Lifetime) NA   Most Severe Ideation Rating (Past Month) NA   Frequency (Past Month) NA   Duration (Past Month) NA   Controllability (Past Month) NA   Protective Factors (Past Month) NA   Reasons for Ideation (Past Month) NA   Actual Attempt (Lifetime) No   Actual Attempt (Past 3 Months) No   Has subject engaged in non-suicidal self-injurious behavior? (Lifetime) No   Has subject engaged in non-suicidal self-injurious behavior? (Past 3 Months) No   Interrupted Attempts (Lifetime) No   Interrupted Attempts (Past 3 Months) No   Aborted or Self-Interrupted Attempt (Lifetime) No   Aborted or Self-Interrupted Attempt (Past 3 Months) No   Preparatory Acts or  Behavior (Lifetime) No   Preparatory Acts or Behavior (Past 3 Months) No   Most Recent Attempt Actual Lethality Code NA   Most Lethal Attempt Actual Lethality Code NA   Initial/First Attempt Actual Lethality Code NA     Patient denies current fears or concerns for personal safety.  Patient denies current or recent suicidal ideation or behaviors.  Patient denies current or recent homicidal ideation or behaviors.  Patient denies current or recent self injurious behavior or ideation.  Patient denies other safety concerns.  Recommended that patient call 911 or go to the local ED should there be a change in any of these risk factors.  Patient reports there are firearms in the house. The firearms are secured in a locked space.     Diagnostic Criteria:  A) Recurrent episode(s) - symptoms have been present during the same 2-week period and represent a change from previous functioning 5 or more symptoms (required for diagnosis)   - Depressed mood. Note: In children and adolescents, can be irritable mood.     - Significant weight gainincrease in appetite.    - Decreased sleep.    - Fatigue or loss of energy.    - Diminished ability to think or concentrate, or indecisiveness.   B) The symptoms cause clinically significant distress or impairment in social, occupational, or other important areas of functioning  C) The episode is not attributable to the physiological effects of a substance or to another medical condition  D) The occurence of major depressive episode is not better explained by other thought / psychotic disorders  E) There has never been a manic episode or hypomanic episode      DSM5 Diagnoses: (Sustained by DSM5 Criteria Listed Above)  Diagnoses: 296.31 (F33.0) Major Depressive Disorder, Recurrent Episode, Mild _ and With anxious distress  Psychosocial & Contextual Factors: Patient is experiencing grief and is adjusting to changes related to the covid-19 pandemic.  She resides with her sisters who are supportive.  "One of her sisters is disabled due to car accidents and she helps care for her.  She went through pre menopause 8 years ago and reports changes in self regulation at that time. 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   WHODAS 2.0 (12 item):   WHODAS 2.0 Total Score 2020   Total Score 16   Total Score MyChart 16     Intervention:   Completed part of the diagnostic assessment questionnaire. Provided engagement regarding concerns that she has regarding returning to work. Engaged with her regarding what she can control in the situation and encouraged her to spend time on self care.     Collateral Reports Completed:  Not Applicable   Patient consents to treatment.       PLAN: (Homework, other):  1. Provider will continue Diagnostic Assessment.  Patient is encouraged to take time for self care and continue using natural support (siblings).  Her next appointment is 1/15/21.       Magdalena Lopez [facility]  2020          _______________________________________  Diagnostic assessment-incomplete     Provider Name:  Magdalena Lopez     Credentials:  [facility]    PATIENT'S NAME: Gilma Hargrove  PREFERRED NAME: Gilma  PRONOUNS: she,her   MRN: [id number]  : 1965   ACCT. NUMBER:  [id number]  DATE OF SERVICE: 20  START TIME:   END TIME:   PREFERRED PHONE: [phone]   May we leave a program related message: Yes  SERVICE MODALITY:  Phone Visit:      Provider verified identity through the following two step process.  Patient provided:  Patient     The patient has been notified of the following:      \"We have found that certain health care needs can be provided without the need for a face to face visit.  This service lets us provide the care you need with a phone conversation.       I will have full access to your [place] medical record during this entire phone call.   I will be " "taking notes for your medical record.      Since this is like an office visit, we will bill your insurance company for this service.       There are potential benefits and risks of telephone visits (e.g. limits to patient confidentiality) that differ from in-person visits.?  Confidentiality still applies for telephone services, and nobody will record the visit.  It is important to be in a quiet, private space that is free of distractions (including cell phone or other devices) during the visit.??      If during the course of the call I believe a telephone visit is not appropriate, you will not be charged for this service\"     Consent has been obtained for this service by care team member: Yes     UNIVERSAL ADULT Mental Health DIAGNOSTIC ASSESSMENT      Identifying Information:  Patient is a 55 year old,  female. The pronoun use throughout this assessment reflects the patient's chosen pronoun.  Patient was referred for an assessment by primary care provider.  Patient asked for the referral. Patient attended the session alone.     Chief Complaint:   The reason for seeking services at this time is: \" eight years ago I went through premenopause and my self regulation has been off since then.\"   At that time she had a complete hysterectomy. Her father's health declined and she moved back to [place]. Her father passed away in 2020 from cancer and her mother passed away in 2020 unexpectedly (1 week after a diagnosis). Her cat also  this year. Problems related to the Covid-19 pandemic have also caused stress (social distancing, works as a ).  She has concerns regarding managing anxiety, including compulsive eating.  Patient has attempted to resolve these concerns in the past through outpatient therapy in her early 20's in [facility] [place]. She shared this helped control her anxiety.     Social/Family History:  Patient reported they grew up in [place], [place].  They were raised " "by biological parents.  Parents stayed .   Patient reported that their childhood was pretty good.  Patient described their current relationships with family of origin as good.  She resides with her two sisters.      The patient describes their cultural background as  and she meditates. She does not identify with a Protestant.  Cultural influences and impact on patient's life structure, values, norms, and healthcare: Racial or Ethnic Self-Identification Caucasion, resided in various locations throughout the [place].  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    These factors will be addressed in the Preliminary Treatment plan.  Patient identified their preferred language to be English. Patient reported they does not need the assistance of an  or other support involved in therapy.     Patient reported experienced significant delays in developmental tasks, such as talking.  She didn't really start talking until age 1 or 2. She also screamed for 3 months a significant amount, after birth.  She reports being very hard on herself from a young age.   Patient's highest education level was graduate school.  She has 2 masters degrees.  Patient identified the following learning problems: processing. She regularly read dictionaries and encyclopedias because she didn't want to \"feel stupid.\"  She reported that she wasn't as quick in learning as her peers.  Modifications will not be used to assist communication in therapy.   Patient reports they are  able to understand written materials.    Patient reported the following relationship history.  Patient's current relationship status is  since 1995.  She was  for 8.5 years and in that relationship for a total of 15 years.  Patient identified their sexual orientation as hetero-normative  Patient " reported having zero child(naveen). Patient identified siblings and friends as part of their support system. She has 3 sisters and a brother. She has friends who don't live in [place].  Patient identified the quality of these relationships as good.  They are connecting differently due to the covid-19 pandemic.  She indicates that they are still friends however communicate less and have less serious conversations.     Patient's current living/housing situation involves staying in own home/apartment.  They live with her two sisters and they report that housing is stable.     Patient is currently employed full time and reports they are able to function appropriately at work.  Patient reports their finances are obtained through employment.  Patient does identify finances as a current stressor.      Patient reported that they have not been involved with the legal system.      Patient's Strengths and Limitations:  Patient identified the following strengths or resources that will help them succeed in treatment: friends / good social support, family support, insight, intelligence, sense of humor and work ethic. Things that may interfere with the patient's success in treatment include: none identified.   __________________________________  Personal and Family Medical History:

## 2021-01-08 ENCOUNTER — TELEPHONE (OUTPATIENT)
Dept: FAMILY MEDICINE | Facility: CLINIC | Age: 56
End: 2021-01-08

## 2021-01-08 DIAGNOSIS — E03.9 ACQUIRED HYPOTHYROIDISM: Primary | ICD-10-CM

## 2021-01-08 NOTE — TELEPHONE ENCOUNTER
Reason for call:  Other   Patient called regarding (reason for call): call back  Additional comments: Patient wants to know if she needs to come in for labs for thyroids. She say that she only has three pills left.    Phone number to reach patient:  Cell number on file:    Telephone Information:   Mobile [phone]       Best Time:  ASAP    Can we leave a detailed message on this number?  YES    Travel screening: Not Applicable

## 2021-01-10 DIAGNOSIS — E03.9 ACQUIRED HYPOTHYROIDISM: ICD-10-CM

## 2021-01-11 RX ORDER — LEVOTHYROXINE SODIUM 75 UG/1
75 TABLET ORAL DAILY
Qty: 90 TABLET | Refills: 2 | Status: SHIPPED | OUTPATIENT
Start: 2021-01-11 | End: 2021-09-27

## 2021-01-11 NOTE — TELEPHONE ENCOUNTER
Patient is schedule for lab on 1/15/2021 per patient schedule.     Sultana De Los Santos RN Flex

## 2021-01-11 NOTE — TELEPHONE ENCOUNTER
Prescription approved per [facility] according to the [facility] Refill Protocol.  Hesham Jeronimo RN, BSN

## 2021-01-11 NOTE — TELEPHONE ENCOUNTER
Thyroid labs placed.   She should have drawn next couple days to see how dose change is working. LYNETTE

## 2021-01-15 ENCOUNTER — VIRTUAL VISIT (OUTPATIENT)
Dept: PSYCHOLOGY | Facility: CLINIC | Age: 56
End: 2021-01-15
Payer: COMMERCIAL

## 2021-01-15 DIAGNOSIS — F33.9 MAJOR DEPRESSIVE DISORDER, RECURRENT EPISODE WITH ANXIOUS DISTRESS (H): Primary | ICD-10-CM

## 2021-01-15 DIAGNOSIS — E03.9 ACQUIRED HYPOTHYROIDISM: ICD-10-CM

## 2021-01-15 LAB — TSH SERPL DL<=0.005 MIU/L-ACNC: 3.26 MU/L (ref 0.4–4)

## 2021-01-15 PROCEDURE — 90791 PSYCH DIAGNOSTIC EVALUATION: CPT | Mod: TEL | Performed by: SOCIAL WORKER

## 2021-01-15 PROCEDURE — 84443 ASSAY THYROID STIM HORMONE: CPT | Performed by: FAMILY MEDICINE

## 2021-01-15 PROCEDURE — 36415 COLL VENOUS BLD VENIPUNCTURE: CPT | Performed by: FAMILY MEDICINE

## 2021-01-15 NOTE — PROGRESS NOTES
"                  Provider Name:  Magdalena Lopez     Credentials:  [facility]    PATIENT'S NAME: Gilma Hargrove  PREFERRED NAME: Gilma  PRONOUNS: she,her   MRN: [id number]  : 1965   ACCT. NUMBER:  [id number]  DATE OF SERVICE: 1/15/21  START TIME: 2:35pm  END TIME: 3:45pm  PREFERRED PHONE: [phone]   May we leave a program related message: Yes  SERVICE MODALITY:  Phone Visit:      Provider verified identity through the following two step process.  Patient provided:  Patient     The patient has been notified of the following:      \"We have found that certain health care needs can be provided without the need for a face to face visit.  This service lets us provide the care you need with a phone conversation.       I will have full access to your [place] medical record during this entire phone call.   I will be taking notes for your medical record.      Since this is like an office visit, we will bill your insurance company for this service.       There are potential benefits and risks of telephone visits (e.g. limits to patient confidentiality) that differ from in-person visits.?  Confidentiality still applies for telephone services, and nobody will record the visit.  It is important to be in a quiet, private space that is free of distractions (including cell phone or other devices) during the visit.??      If during the course of the call I believe a telephone visit is not appropriate, you will not be charged for this service\"     Consent has been obtained for this service by care team member: Yes     UNIVERSAL ADULT Mental Health DIAGNOSTIC ASSESSMENT      Identifying Information:  Patient is a 55 year old,  female. The pronoun use throughout this assessment reflects the patient's chosen pronoun.  Patient was referred for an assessment by primary care provider.  Patient asked for the referral. Patient attended the session alone.     Chief Complaint:   The reason for seeking services at this time is: \" " "eight years ago I went through premenopause and my self regulation has been off since then.\"   At that time she had a complete hysterectomy. Her father's health declined and she moved back to [place]. Her father passed away in 2020 from cancer and her mother passed away in 2020 unexpectedly (1 week after a diagnosis). Her cat also  this year. Problems related to the Covid-19 pandemic have also caused stress (social distancing, works as a ).  She has concerns regarding managing anxiety, including compulsive eating.  Patient has attempted to resolve these concerns in the past through outpatient therapy in her early 20's in [place], [place]. She shared this helped control her anxiety.     Social/Family History:  Patient reported they grew up in [place], [place].  They were raised by biological parents.  Parents stayed .   Patient reported that their childhood was pretty good.  Patient described their current relationships with family of origin as good.  She resides with her two sisters.      The patient describes their cultural background as  and she meditates. She does not identify with a Yarsani.  Cultural influences and impact on patient's life structure, values, norms, and healthcare: Racial or Ethnic Self-Identification Caucasion, resided in various locations throughout the [place].  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    These factors will be addressed in the Preliminary Treatment plan.  Patient identified their preferred language to be English. Patient reported they does not need the assistance of an  or other support involved in therapy.     Patient reported experienced significant delays in developmental tasks, such as talking.  She didn't really start talking until age 1 or 2. She also screamed " "for 3 months a significant amount, after birth.  She reports being very hard on herself from a young age.   Patient's highest education level was graduate school.  She has 2 masters degrees.  Patient identified the following learning problems: processing. She regularly read dictionaries and encyclopedias because she didn't want to \"feel stupid.\"  She reported that she wasn't as quick in learning as her peers.  Modifications will not be used to assist communication in therapy.   Patient reports they are  able to understand written materials.    Patient reported the following relationship history.  Patient's current relationship status is  since 1995.  She was  for 8.5 years and in that relationship for a total of 15 years.  Patient identified their sexual orientation as hetero-normative  Patient reported having zero child(naveen). Patient identified siblings and friends as part of their support system. She has 3 sisters and a brother. She has friends who don't live in [place].  Patient identified the quality of these relationships as good.  They are connecting differently due to the covid-19 pandemic.  She indicates that they are still friends however communicate less and have less serious conversations.     Patient's current living/housing situation involves staying in own home/apartment.  They live with her two sisters and they report that housing is stable.     Patient is currently employed full time and reports they are able to function appropriately at work.  Patient reports their finances are obtained through employment.  Patient does identify finances as a current stressor.      Patient reported that they have not been involved with the legal system.      Patient's Strengths and Limitations:  Patient identified the following strengths or resources that will help them succeed in treatment: friends / good social support, family support, insight, intelligence, sense of humor and work ethic. Things " that may interfere with the patient's success in treatment include: none identified.     Personal and Family Medical History:   Patient does report a family history of mental health concerns. Her father had severe depression including suicidal ideation. Her two sisters have depression.  Patient reports family history includes Arthritis in her father; Other Cancer in her father; Thyroid Cancer in her mother.    Patient does report Mental Health Diagnosis and/or Treatment.  Patient reported the following previous diagnoses which include(s): an Anxiety Disorder and Depression.  Patient reported symptoms began in 8 years ago.   Patient has received mental health services in the past: outpatient therapy .  Psychiatric Hospitalizations: None.  Patient denies a history of civil commitment.  Currently, patient is not receiving other mental health services.  These include none.         Patient has had a physical exam to rule out medical causes for current symptoms.  Date of last physical exam was within the past year. Client was encouraged to follow up with PCP if symptoms were to develop. The patient has a [facility] Provider, who is named Chyna Blunt.  Patient reports the following current medical concerns: overweight.  Patient denies any issues with pain..   There are not significant appetite / nutritional concerns / weight changes.   Patient does not report a history of head injury / trauma / cognitive impairment.      Patient reports current meds as:   See list in Epic     Medication Adherence:  Patient reports taking prescribed medications as prescribed.    Patient Allergies:    Allergies   Allergen Reactions     Codeine      Sulfa Drugs        Medical History:  No past medical history on file.      Current Mental Status Exam:   Appearance:  Appropriate    Eye Contact:  Good   Psychomotor:  Normal       Gait / station:  Unable to assess due to video visit  Attitude / Demeanor: Cooperative   Speech       Rate / Production: Talkative      Volume:  Normal  volume      Language:  intact  Mood:   Depressed   Affect:   Appropriate    Thought Content: Clear   Thought Process: Coherent       Associations: No loosening of associations  Insight:   Good   Judgment:  Intact   Orientation:  All  Attention/concentration: Good    Rating Scales:    PHQ9:    PHQ-9 SCORE 9/25/2020 12/9/2020   PHQ-9 Total Score MyChart 10 (Moderate depression) 9 (Mild depression)   PHQ-9 Total Score 10 9   ;    GAD7:    FAYE-7 SCORE 9/25/2020 12/9/2020   Total Score 5 (mild anxiety) 4 (minimal anxiety)   Total Score 5 4     CGI:     First:Considering your total clinical experience with this particular patient population, how severe are the patient's symptoms at this time?: 4 (12/10/2020  8:00 AM)  ;    Most recentCompared to the patient's condition at the START of treatment, this patient's condition is: 4 (12/10/2020  8:00 AM)      Substance Use:  Patient reported the following biological family members or relatives with chemical health issues: Father reportedly used alcohol .  Patient has not received substance use disorder and/or gambling treatment in the past.  Patient has not ever been to detox.  Patient is not currently receiving any chemical dependency treatment.         Substance Number of times Per day/  Week  /month   Average amount Period of heaviest use Date of last use     Age of 1st use Route of administration   has used Alcohol 2-3 month Small glass of wine, scotch, or richard beer   1995 after divorce  Last week teenager but didn't like alcohol until college oral   has used Marijuana-used in high school and college but not since            has not used Amphetamines            has not used Cocaine/crack             has not used Hallucinogens          has not used Inhalants          has not used Heroin          has not used Other Opiates          has not used Benzodiazepine            has not used Barbiturates          has not used Over  "the counter meds.          has use Caffeine 1 day 1 cup of coffee     oral   has not used Nicotine           has not used other substances not listed above:  Identify:                 CAGE- AID:    CAGE-AID Total Score 12/9/2020 12/10/2020   Total Score 0 0   Total Score MyChart 0 (A total score of 2 or greater is considered clinically significant) -        Patient reported the following problems as a result of their substance use: none.  Patient is not concerned about substance use.   Patient does not have other addictive behaviors she is concerned about. She shared that her grandfather had a gambling problem and she needs to be very careful when she gambles-\"I felt that compulsion\".       Significant Losses / Trauma / Abuse / Neglect Issues:   Patient did not serve in the .  There are indications or report of significant loss, trauma, abuse or neglect issues related to: are no indications and client denies any losses, trauma, abuse, or neglect concerns.  Concerns for possible neglect are not present.     Safety Assessment:   Current Safety Concerns:  Benson Suicide Severity Rating Scale (Lifetime/Recent)  Benson Suicide Severity Rating (Lifetime/Recent) 12/10/2020   1. Wish to be Dead (Lifetime) No   1. Wish to be Dead (Recent) No   2. Non-Specific Active Suicidal Thoughts (Lifetime) No   2. Non-Specific Active Suicidal Thoughts (Recent) No   3. Active Suicidal Ideation with any Methods (Not Plan) Without Intent to Act (Lifetime) No   3. Active Sucidal Ideation with any Methods (Not Plan) Without Intent to Act (Recent) No   4. Active Suicidal Ideation with Some Intent to Act, Without Specific Plan (Lifetime) No   4. Active Suicidal Ideation with Some Intent to Act, Without Specific Plan (Recent) No   5. Active Suicidal Ideation with Specific Plan and Intent (Lifetime) No   5. Active Suicidal Ideation with Specific Plan and Intent (Recent) No   Most Severe Ideation Rating (Lifetime) NA   Frequency " (Lifetime) NA   Duration (Lifetime) NA   Controllability (Lifetime) NA   Protective Factors  (Lifetime) NA   Reasons for Ideation (Lifetime) NA   Most Severe Ideation Rating (Past Month) NA   Frequency (Past Month) NA   Duration (Past Month) NA   Controllability (Past Month) NA   Protective Factors (Past Month) NA   Reasons for Ideation (Past Month) NA   Actual Attempt (Lifetime) No   Actual Attempt (Past 3 Months) No   Has subject engaged in non-suicidal self-injurious behavior? (Lifetime) No   Has subject engaged in non-suicidal self-injurious behavior? (Past 3 Months) No   Interrupted Attempts (Lifetime) No   Interrupted Attempts (Past 3 Months) No   Aborted or Self-Interrupted Attempt (Lifetime) No   Aborted or Self-Interrupted Attempt (Past 3 Months) No   Preparatory Acts or Behavior (Lifetime) No   Preparatory Acts or Behavior (Past 3 Months) No   Most Recent Attempt Actual Lethality Code NA   Most Lethal Attempt Actual Lethality Code NA   Initial/First Attempt Actual Lethality Code NA     Patient denies current homicidal ideation and behaviors.  Patient denies current self-injurious ideation and behaviors.    Patient denied risk behaviors associated with substance use.  Patient denies any high risk behaviors associated with mental health symptoms.  Patient reports the following current concerns for their personal safety: None.  Patient reports there are not  firearms in the house.     History of Safety Concerns:  Patient denied a history of homicidal ideation.     Patient denied a history of personal safety concerns.    Patient denied a history of assaultive behaviors.    Patient denied a history of sexual assault behaviors.     Patient denied a history of risk behaviors associated with substance use.  Patient denies any history of high risk behaviors associated with mental health symptoms.  Patient reports the following protective factors: safe and stable environment, regular sleep, daily obligations and  structured day    Risk Plan:  See Recommendations for Safety and Risk Management Plan    Review of Symptoms per patient report:  Depression: Change in sleep, Change in energy level, Difficulties concentrating, Change in appetite and Feeling sad, down, or depressed  Abigail:  No Symptoms  Psychosis: No Symptoms  Anxiety: Sleep disturbance and Poor concentration  Panic:  No symptoms  Post Traumatic Stress Disorder:  No Symptoms   Eating Disorder: No Symptoms  ADD / ADHD:  No symptoms  Conduct Disorder: No symptoms  Autism Spectrum Disorder: No symptoms  Obsessive Compulsive Disorder: No Symptoms    Patient reports the following compulsive behaviors and treatment history: none    Diagnostic Criteria:   Diagnostic Criteria:  A) Recurrent episode(s) - symptoms have been present during the same 2-week period and represent a change from previous functioning 5 or more symptoms (required for diagnosis)   - Depressed mood. Note: In children and adolescents, can be irritable mood.     - Significant weight gain increase in appetite.    - Decreased sleep.    - Fatigue or loss of energy.    - Diminished ability to think or concentrate, or indecisiveness.   B) The symptoms cause clinically significant distress or impairment in social, occupational, or other important areas of functioning  C) The episode is not attributable to the physiological effects of a substance or to another medical condition  D) The occurrence of major depressive episode is not better explained by other thought / psychotic disorders  E) There has never been a manic episode or hypomanic episode    Functional Status:  Patient reports the following functional impairments: management of the household and or completion of tasks, relationship(s), self-care and social interactions.     WHODAS:   WHODAS 2.0 Total Score 12/9/2020   Total Score 16   Total Score MyChart 16     Nonprogrammatic care:  Patient is requesting basic services to address current mental health  concerns.    Clinical Summary:  1. Reason for assessment: To determine diagnoses and assess treatment needs. Patient was referred to outpatient therapy by the primary care provider.    2. Psychosocial, Cultural and Contextual Factors:Patient is experiencing grief and is adjusting to changes related to the covid-19 pandemic.  She resides with her sisters who are supportive. One of her sisters is disabled due to car accidents and she helps care for her.  She went through pre menopause 8 years ago and reports changes in self regulation at that time. 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    3. Principal DSM5 Diagnoses  (Sustained by DSM5 Criteria Listed Above):   296.31 (F33.0) Major Depressive Disorder, Recurrent Episode, Mild _ and With anxious distress  4. Other Diagnoses that is relevant to services:   none  5. Provisional Diagnosis:  none  6. Prognosis: Expect Improvement.  7. Likely consequences of symptoms if not treated: patient distress, relationship problems, possible difficulty with vocational functioning.   8. Client strengths include:  educated, employed, goal-focused, has a previous history of therapy, insightful, intelligent, support of family, friends and providers and work history .     Recommendations:     1. Plan for Safety and Risk Management:   Recommended that patient call 911 or go to the local ED should there be a change in any of these risk factors..          Report to child / adult protection services was NA.     2. Patient's identified cultural aspects do not require modifications for therapy. Patient will be encouraged to utilize strengths from their culture during treatment.     3. Initial Treatment will focus on:    Depression-motivational interviewing, psychoeducation, skills training, CBT, and behavioral activation strategies for managing symptoms of  depression  Anxiety-Motivational interviewing, skills training, and mindfulness strategies for managing symptoms of anxiety     4. Resources/Service Plan:    services are not indicated.   Modifications to assist communication are not indicated.   Additional disability accommodations are not indicated.      5. Collaboration:   Collaboration / coordination of treatment will be initiated with the following  support professionals: none.      6.  Referrals:   The following referral(s) will be initiated: none. Next Scheduled Appointment: 1/22/21.     A Release of Information has been obtained for the following: none.    7. SHIREEN:   Substance use disorder is not a treatment concern for patient. Patient will be asked about substance use at appointments to assess for changes and treatment needs.     8. Records:   These were reviewed at time of assessment.   Information in this assessment was obtained from the medical record and  provided by patient who is a good historian.    Patient will have open access to their mental health medical record.      Provider Name/ Credentials:  HERMELINDO Keller  January 15, 2021

## 2021-01-22 ENCOUNTER — VIRTUAL VISIT (OUTPATIENT)
Dept: PSYCHOLOGY | Facility: CLINIC | Age: 56
End: 2021-01-22
Payer: COMMERCIAL

## 2021-01-22 DIAGNOSIS — F33.9 MAJOR DEPRESSIVE DISORDER, RECURRENT EPISODE WITH ANXIOUS DISTRESS (H): Primary | ICD-10-CM

## 2021-01-22 PROCEDURE — 90834 PSYTX W PT 45 MINUTES: CPT | Mod: 95 | Performed by: SOCIAL WORKER

## 2021-01-22 ASSESSMENT — PATIENT HEALTH QUESTIONNAIRE - PHQ9: SUM OF ALL RESPONSES TO PHQ QUESTIONS 1-9: 7

## 2021-01-22 ASSESSMENT — ANXIETY QUESTIONNAIRES
IF YOU CHECKED OFF ANY PROBLEMS ON THIS QUESTIONNAIRE, HOW DIFFICULT HAVE THESE PROBLEMS MADE IT FOR YOU TO DO YOUR WORK, TAKE CARE OF THINGS AT HOME, OR GET ALONG WITH OTHER PEOPLE: VERY DIFFICULT

## 2021-01-22 NOTE — PROGRESS NOTES
Progress Note    Patient Name: Gilma Hargrove  Date: 1/22/21         Service Type: Individual      Session Start Time: 10:05am  Session End Time: 11:00am     Session Length: 55 minutes     Session #: 4    Attendees: Client attended alone    Service Modality:  Video Visit:      Provider verified identity through the following two step process.  Patient provided:  Patient photo    Telemedicine Visit: The patient's condition can be safely assessed and treated via synchronous audio and visual telemedicine encounter.      Reason for Telemedicine Visit: Services only offered telehealth    Originating Site (Patient Location): Patient's home    Distant Site (Provider Location): Provider Remote Setting    Consent:  The patient/guardian has verbally consented to: the potential risks and benefits of telemedicine (video visit) versus in person care; bill my insurance or make self-payment for services provided; and responsibility for payment of non-covered services.     Patient would like the video invitation sent by:  Text to cell phone: [phone]    Mode of Communication:  Video Conference via [facility]    As the provider I attest to compliance with applicable laws and regulations related to telemedicine.     Treatment Plan Last Reviewed: 1/22/21  PHQ-9 / FAYE-7 : PHQ-9:  7, will complete FAYE-7 at next visit     DATA  Interactive Complexity: No  Crisis: No       Progress Since Last Session (Related to Symptoms / Goals / Homework):   Symptoms: No change patient continues to have anxiety regarding the covid-19 pandemic and low motivation to complete tasks at home    Homework: Achieved / completed to satisfaction      Episode of Care Goals: Minimal progress - PREPARATION (Decided to change - considering how); Intervened by negotiating a change plan and determining options / strategies for behavior change, identifying triggers, exploring social supports, and working towards setting a date to  begin behavior change     Current / Ongoing Stressors and Concerns:   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 She has low motivation to complete housework.      Treatment Objective(s) Addressed in This Session:   Patient will Identify negative self-talk and behaviors: challenge core beliefs, myths, and actions.  She will increase coping skills to manage depression.   Patient will Increase interest, engagement, and pleasure in doing things.     Intervention:   Provided engagement regarding progress since the last session.  Provided reflective listening regarding her concerns, validated feelings while encouraging her to use positive self talk and challenge anxious/negative thoughts. Provided encouragement to complete housework by breaking down the task.          ASSESSMENT: Current Emotional / Mental Status (status of significant symptoms):   Risk status (Self / Other harm or suicidal ideation)   Patient denies current fears or concerns for personal safety.   Patient denies current or recent suicidal ideation or behaviors.   Patient denies current or recent homicidal ideation or behaviors.   Patient denies current or recent self injurious behavior or ideation.   Patient denies other safety concerns.   Patient reports there has been no change in risk factors since their last session.     Patient reports there has been no change in protective factors since their last session.     Recommended that patient call 911 or go to the local ED should there be a change in any of these risk factors.     Appearance:   Appropriate    Eye Contact:   Good    Psychomotor Behavior: Normal    Attitude:   Cooperative  Pleasant   Orientation:   All   Speech    Rate / Production: Normal/ Responsive Normal     Volume:  Normal    Mood:    Normal during session, endorses anxiety regarding pandemic/returning to work    Affect:    Appropriate    Thought Content:  Clear     Thought Form:  Coherent  Logical    Insight:    Good      Medication Review:   No changes to current psychiatric medication(s)     Medication Compliance:   Yes     Changes in Health Issues:   None reported     Chemical Use Review:   Substance Use: no concerns noted      Tobacco Use: No current tobacco use.      Diagnosis:  296.31 (F33.0) Major Depressive Disorder, Recurrent Episode, Mild and With anxious distress    Collateral Reports Completed:   Not Applicable    PLAN: (Patient Tasks / Therapist Tasks / Other)  Patient is encouraged to challenge negative/anxious thoughts and consider positive aspects of situation.  Her next appointment is scheduled 2/5/21.         Magdalena Lopez [facility]                                                         ______________________________________________________________________    Treatment Plan    Patient's Name: Gilma Hargrove  YOB: 1965    Date: 1/22/21    DSM5 Diagnoses: 296.31 (F33.0) Major Depressive Disorder, Recurrent Episode, Mild and With anxious distress  Psychosocial / Contextual Factors: Patient is experiencing grief and is adjusting to changes related to the covid-19 pandemic.   WHODAS: 16    Referral / Collaboration:  Referral to another professional/service is not indicated at this time..    Anticipated number of session or this episode of care: 12      Measurable Treatment Goal(s) related to diagnosis / functional impairment(s)  Goal 1: Patient will increase coping skills to manage depression and anxiety.       Objective #A (Patient Action)    Patient will Identify negative self-talk and behaviors: challenge core beliefs, myths, and actions.  She will increase coping skills to manage depression.   Status: New - Date: 1/22/21     Intervention(s)  Therapist will provide motivational interviewing, psychoeducation, skills training, CBT, and behavioral activation strategies for managing symptoms of depression.     Objective #B  Patient will identify three  distraction and diversion activities and use those activities to decrease level of anxiety  .  She will increase coping skills to manage anxiety.   Status: New - Date: 1/22/21     Intervention(s)  Therapist will provide motivational interviewing, skills training, and mindfulness strategies for managing symptoms of anxiety.     Objective #C  Patient will Increase interest, engagement, and pleasure in doing things.  Status: New - Date: 1/22/21     Intervention(s)  Therapist will provide motivational interviewing for participating in activities of interest and completing housework.     This completed plan will be reviewed/updated with patient at the next session.  The goals were developed based on identified needs.     HERMELINDO Keller  January 22, 2021

## 2021-02-05 ENCOUNTER — VIRTUAL VISIT (OUTPATIENT)
Dept: PSYCHOLOGY | Facility: CLINIC | Age: 56
End: 2021-02-05
Payer: COMMERCIAL

## 2021-02-05 DIAGNOSIS — F33.9 MAJOR DEPRESSIVE DISORDER, RECURRENT EPISODE WITH ANXIOUS DISTRESS (H): Primary | ICD-10-CM

## 2021-02-05 PROCEDURE — 90834 PSYTX W PT 45 MINUTES: CPT | Mod: 95 | Performed by: SOCIAL WORKER

## 2021-02-05 NOTE — PROGRESS NOTES
Progress Note    Patient Name: Gilma Hargrove  Date: 2/5/21         Service Type: Individual      Session Start Time: 9:15am  Session End Time: 10:00am     Session Length: 45 minutes     Session #: 5    Attendees: Client attended alone    Service Modality:  Video Visit:      Provider verified identity through the following two step process.  Patient provided:  Patient photo    Telemedicine Visit: The patient's condition can be safely assessed and treated via synchronous audio and visual telemedicine encounter.      Reason for Telemedicine Visit: Services only offered telehealth    Originating Site (Patient Location): Patient's home    Distant Site (Provider Location): Provider Remote Setting    Consent:  The patient/guardian has verbally consented to: the potential risks and benefits of telemedicine (video visit) versus in person care; bill my insurance or make self-payment for services provided; and responsibility for payment of non-covered services.     Patient would like the video invitation sent by:  Text to cell phone: [phone]    Mode of Communication:  Video Conference via [employer]well    As the provider I attest to compliance with applicable laws and regulations related to telemedicine.     Treatment Plan Last Reviewed: 2/5/21  PHQ-9 / FAYE-7 : PHQ-9:  7     DATA  Interactive Complexity: No  Crisis: No       Progress Since Last Session (Related to Symptoms / Goals / Homework):   Symptoms: No change patient continues to have anxiety regarding the covid-19 pandemic and low motivation to complete tasks at home    Homework: Achieved / completed to satisfaction      Episode of Care Goals: Minimal progress - PREPARATION (Decided to change - considering how); Intervened by negotiating a change plan and determining options / strategies for behavior change, identifying triggers, exploring social supports, and working towards setting a date to begin behavior change     Current /  Ongoing Stressors and Concerns:   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 She has low motivation to complete housework. She has grief due to loss of mother.      Treatment Objective(s) Addressed in This Session:   Patient will Identify negative self-talk and behaviors: challenge core beliefs, myths, and actions.  She will increase coping skills to manage depression.   Patient will Increase interest, engagement, and pleasure in doing things.     Intervention:   Provided engagement regarding progress since the last session.  Provided reflective listening regarding her concerns, validated feelings while encouraging her to use positive self talk. Provided encouragement to complete housework.         ASSESSMENT: Current Emotional / Mental Status (status of significant symptoms):   Risk status (Self / Other harm or suicidal ideation)   Patient denies current fears or concerns for personal safety.   Patient denies current or recent suicidal ideation or behaviors.   Patient denies current or recent homicidal ideation or behaviors.   Patient denies current or recent self injurious behavior or ideation.   Patient denies other safety concerns.   Patient reports there has been no change in risk factors since their last session.     Patient reports there has been no change in protective factors since their last session.     Recommended that patient call 911 or go to the local ED should there be a change in any of these risk factors.     Appearance:   Appropriate    Eye Contact:   Good    Psychomotor Behavior: Normal    Attitude:   Cooperative  Pleasant   Orientation:   All   Speech    Rate / Production: Normal/ Responsive Normal     Volume:  Normal    Mood:    Normal during session, endorses anxiety regarding pandemic/returning to work    Affect:    Appropriate    Thought Content:  Clear    Thought Form:  Coherent  Logical    Insight:    Good       Medication Review:   No changes to current psychiatric medication(s)     Medication Compliance:   Yes     Changes in Health Issues:   None reported     Chemical Use Review:   Substance Use: no concerns noted      Tobacco Use: No current tobacco use.      Diagnosis:  296.31 (F33.0) Major Depressive Disorder, Recurrent Episode, Mild and With anxious distress    Collateral Reports Completed:   Not Applicable    PLAN: (Patient Tasks / Therapist Tasks / Other)  Patient is encouraged to break down tasks into smaller tasks.  Her next appointment is scheduled 2/19/21.        Magdalena Lopez [facility]                                                         ______________________________________________________________________    Treatment Plan    Patient's Name: Gilma Hargrove  YOB: 1965    Date: 1/22/21    DSM5 Diagnoses: 296.31 (F33.0) Major Depressive Disorder, Recurrent Episode, Mild and With anxious distress  Psychosocial / Contextual Factors: Patient is experiencing grief and is adjusting to changes related to the covid-19 pandemic.   WHODAS: 16    Referral / Collaboration:  Referral to another professional/service is not indicated at this time.    Anticipated number of session or this episode of care: 12      Measurable Treatment Goal(s) related to diagnosis / functional impairment(s)  Goal 1: Patient will increase coping skills to manage depression and anxiety.       Objective #A (Patient Action)    Patient will Identify negative self-talk and behaviors: challenge core beliefs, myths, and actions.  She will increase coping skills to manage depression.   Status: New - Date: 1/22/21     Intervention(s)  Therapist will provide motivational interviewing, psychoeducation, skills training, CBT, and behavioral activation strategies for managing symptoms of depression.     Objective #B  Patient will identify three distraction and diversion activities and use those activities to decrease level of anxiety  .  She  will increase coping skills to manage anxiety.   Status: New - Date: 1/22/21     Intervention(s)  Therapist will provide motivational interviewing, skills training, and mindfulness strategies for managing symptoms of anxiety.     Objective #C  Patient will Increase interest, engagement, and pleasure in doing things.  Status: New - Date: 1/22/21     Intervention(s)  Therapist will provide motivational interviewing for participating in activities of interest and completing housework.     This completed plan will be reviewed/updated with patient at the next session.  The goals were developed based on identified needs.     HERMELINDO Keller  January 22, 2021

## 2021-02-19 ENCOUNTER — VIRTUAL VISIT (OUTPATIENT)
Dept: PSYCHOLOGY | Facility: CLINIC | Age: 56
End: 2021-02-19
Payer: COMMERCIAL

## 2021-02-19 DIAGNOSIS — F33.9 MAJOR DEPRESSIVE DISORDER, RECURRENT EPISODE WITH ANXIOUS DISTRESS (H): Primary | ICD-10-CM

## 2021-02-19 PROCEDURE — 90837 PSYTX W PT 60 MINUTES: CPT | Mod: GT | Performed by: SOCIAL WORKER

## 2021-02-19 NOTE — PROGRESS NOTES
Progress Note    Patient Name: Gilma Hargrove  Date: 2/19/21         Service Type: Individual      Session Start Time: 9:05am  Session End Time: 10:00am     Session Length: 55 minutes     Session #: 5    Attendees: Client attended alone    Service Modality:  Video Visit:      Provider verified identity through the following two step process.  Patient provided:  Patient photo    Telemedicine Visit: The patient's condition can be safely assessed and treated via synchronous audio and visual telemedicine encounter.      Reason for Telemedicine Visit: Services only offered telehealth    Originating Site (Patient Location): Patient's home    Distant Site (Provider Location): Provider Remote Setting    Consent:  The patient/guardian has verbally consented to: the potential risks and benefits of telemedicine (video visit) versus in person care; bill my insurance or make self-payment for services provided; and responsibility for payment of non-covered services.     Patient would like the video invitation sent by:  Text to cell phone: [phone]    Mode of Communication:  Video Conference via [employer]well    As the provider I attest to compliance with applicable laws and regulations related to telemedicine.     Treatment Plan Last Reviewed: 2/19/21  PHQ-9 / FAYE-7 : PHQ-9:  7/4    DATA  Interactive Complexity: No  Crisis: No       Progress Since Last Session (Related to Symptoms / Goals / Homework):   Symptoms: No change patient continues to have anxiety regarding the covid-19 pandemic and low motivation to complete tasks at home. She is concerned about returning to work prior to being vaccinated.     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 She has low motivation to complete housework. She has grief due to loss of mother.      Treatment Objective(s) Addressed in This Session:   Patient will Identify negative self-talk and behaviors: challenge core beliefs, myths, and actions.  She will increase coping skills to manage depression.   Patient will Increase interest, engagement, and pleasure in doing things.     Intervention:   Provided engagement regarding progress since the last session.  Provided reflective listening regarding her concerns, validated feelings while encouraging her to consider all aspects of the situation.         ASSESSMENT: Current Emotional / Mental Status (status of significant symptoms):   Risk status (Self / Other harm or suicidal ideation)   Patient denies current fears or concerns for personal safety.   Patient denies current or recent suicidal ideation or behaviors.   Patient denies current or recent homicidal ideation or behaviors.   Patient denies current or recent self injurious behavior or ideation.   Patient denies other safety concerns.   Patient reports there has been no change in risk factors since their last session.     Patient reports there has been no change in protective factors since their last session.     Recommended that patient call 911 or go to the local ED should there be a change in any of these risk factors.     Appearance:   Appropriate    Eye Contact:   Good    Psychomotor Behavior: Normal    Attitude:   Cooperative  Pleasant   Orientation:   All   Speech    Rate / Production: Normal/ Responsive Normal     Volume:  Normal    Mood:    Normal during session, endorses concern regarding pandemic/returning to work    Affect:    Appropriate    Thought Content:  Clear    Thought  Form:  Coherent  Logical    Insight:    Good      Medication Review:   No changes to current psychiatric medication(s)     Medication Compliance:   Yes     Changes in Health Issues:   None reported     Chemical Use Review:   Substance Use: no concerns noted      Tobacco Use: No current tobacco use.      Diagnosis:  296.31 (F33.0) Major Depressive Disorder, Recurrent Episode, Mild and With anxious distress    Collateral Reports Completed:   Not Applicable    PLAN: (Patient Tasks / Therapist Tasks / Other)  Patient is encouraged to consider all aspects of the situation (pros and cons).  Her next appointment is scheduled 3/5/21.        Magdalena Lopez [facility]                                                         ______________________________________________________________________    Treatment Plan    Patient's Name: Gilma Hargrove  YOB: 1965    Date: 1/22/21    DSM5 Diagnoses: 296.31 (F33.0) Major Depressive Disorder, Recurrent Episode, Mild and With anxious distress  Psychosocial / Contextual Factors: Patient is experiencing grief and is adjusting to changes related to the covid-19 pandemic.   WHODAS: 16    Referral / Collaboration:  Referral to another professional/service is not indicated at this time.    Anticipated number of session or this episode of care: 12      Measurable Treatment Goal(s) related to diagnosis / functional impairment(s)  Goal 1: Patient will increase coping skills to manage depression and anxiety.       Objective #A (Patient Action)    Patient will Identify negative self-talk and behaviors: challenge core beliefs, myths, and actions.  She will increase coping skills to manage depression.   Status: New - Date: 1/22/21     Intervention(s)  Therapist will provide motivational interviewing, psychoeducation, skills training, CBT, and behavioral activation strategies for managing symptoms of depression.     Objective #B  Patient will identify three distraction and diversion activities  and use those activities to decrease level of anxiety  .  She will increase coping skills to manage anxiety.   Status: New - Date: 1/22/21     Intervention(s)  Therapist will provide motivational interviewing, skills training, and mindfulness strategies for managing symptoms of anxiety.     Objective #C  Patient will Increase interest, engagement, and pleasure in doing things.  Status: New - Date: 1/22/21     Intervention(s)  Therapist will provide motivational interviewing for participating in activities of interest and completing housework.     This completed plan will be reviewed/updated with patient at the next session.  The goals were developed based on identified needs.     Magdalena Lopez, [facility]  January 22, 2021

## 2021-03-11 ENCOUNTER — VIRTUAL VISIT (OUTPATIENT)
Dept: PSYCHOLOGY | Facility: CLINIC | Age: 56
End: 2021-03-11
Payer: COMMERCIAL

## 2021-03-11 DIAGNOSIS — F33.9 MAJOR DEPRESSIVE DISORDER, RECURRENT EPISODE WITH ANXIOUS DISTRESS (H): Primary | ICD-10-CM

## 2021-03-11 PROCEDURE — 90834 PSYTX W PT 45 MINUTES: CPT | Mod: GT | Performed by: SOCIAL WORKER

## 2021-03-11 NOTE — PROGRESS NOTES
Progress Note    Patient Name: Gilma Hargrove  Date: 3/11/21         Service Type: Individual      Session Start Time: 8:05am  Session End Time: 9:05am     Session Length: 60 minutes     Session #: 6    Attendees: Client attended alone    Service Modality:  Video Visit:      Provider verified identity through the following two step process.  Patient provided:  Patient photo    Telemedicine Visit: The patient's condition can be safely assessed and treated via synchronous audio and visual telemedicine encounter.      Reason for Telemedicine Visit: Services only offered telehealth    Originating Site (Patient Location): Patient's home    Distant Site (Provider Location): Provider Remote Setting    Consent:  The patient/guardian has verbally consented to: the potential risks and benefits of telemedicine (video visit) versus in person care; bill my insurance or make self-payment for services provided; and responsibility for payment of non-covered services.     Patient would like the video invitation sent by:  Text to cell phone: [phone]    Mode of Communication:  Video Conference via [facility]    As the provider I attest to compliance with applicable laws and regulations related to telemedicine.     Treatment Plan Last Reviewed: 3/11/21  PHQ-9 / FAYE-7 : PHQ-9:  7/6    DATA  Interactive Complexity: No  Crisis: No       Progress Since Last Session (Related to Symptoms / Goals / Homework):   Symptoms: No change patient continues to have anxiety regarding the covid-19 pandemic and low motivation to complete tasks at home. She feels less anxious about returning to work due to having her covid-19 vaccine.      Homework: Achieved / completed to satisfaction      Episode of Care Goals: Minimal progress - PREPARATION (Decided to change - considering how); Intervened by negotiating a change plan and determining options / strategies for behavior change, identifying triggers, exploring  social supports, and working towards setting a date to begin behavior change     Current / Ongoing Stressors and Concerns:   Covid-19 pandemic   She has low motivation to complete housework.    She has grief due to loss of mother.    Anxiety/fear of dying      Treatment Objective(s) Addressed in This Session:   Patient will Identify negative self-talk and behaviors: challenge core beliefs, myths, and actions.  She will increase coping skills to manage depression.   Patient will Increase interest, engagement, and pleasure in doing things.     Intervention:   Provided engagement regarding progress since the last session.  Provided reflective listening regarding her concerns, validated feelings, engaged with her regarding returning to work.         ASSESSMENT: Current Emotional / Mental Status (status of significant symptoms):   Risk status (Self / Other harm or suicidal ideation)   Patient denies current fears or concerns for personal safety.   Patient denies current or recent suicidal ideation or behaviors.   Patient denies current or recent homicidal ideation or behaviors.   Patient denies current or recent self injurious behavior or ideation.   Patient denies other safety concerns.   Patient reports there has been no change in risk factors since their last session.     Patient reports there has been no change in protective factors since their last session.     Recommended that patient call 911 or go to the local ED should there be a change in any of these risk factors.     Appearance:   Appropriate    Eye Contact:   Good    Psychomotor Behavior: Normal    Attitude:   Cooperative  Pleasant   Orientation:   All   Speech    Rate / Production: Normal/ Responsive Normal     Volume:  Normal    Mood:    Normal during session, endorses anxiety regarding death/fear of dying   Affect:    Appropriate    Thought Content:  Clear    Thought Form:  Coherent  Logical    Insight:    Good      Medication Review:   No changes to  current psychiatric medication(s)     Medication Compliance:   Yes     Changes in Health Issues:   None reported     Chemical Use Review:   Substance Use: no concerns noted      Tobacco Use: No current tobacco use.      Diagnosis:  296.31 (F33.0) Major Depressive Disorder, Recurrent Episode, Mild and With anxious distress    Collateral Reports Completed:   Not Applicable    PLAN: (Patient Tasks / Therapist Tasks / Other)  Patient is encouraged to consider to take time to rest and plan self care.         Magdalena Lopez, [facility]                                                         ______________________________________________________________________    Treatment Plan    Patient's Name: Gilma Hargrove  YOB: 1965    Date: 1/22/21    DSM5 Diagnoses: 296.31 (F33.0) Major Depressive Disorder, Recurrent Episode, Mild and With anxious distress  Psychosocial / Contextual Factors: Patient is experiencing grief and is adjusting to changes related to the covid-19 pandemic.   WHODAS: 16    Referral / Collaboration:  Referral to another professional/service is not indicated at this time.    Anticipated number of session or this episode of care: 12      Measurable Treatment Goal(s) related to diagnosis / functional impairment(s)  Goal 1: Patient will increase coping skills to manage depression and anxiety.       Objective #A (Patient Action)    Patient will Identify negative self-talk and behaviors: challenge core beliefs, myths, and actions.  She will increase coping skills to manage depression.   Status: New - Date: 1/22/21     Intervention(s)  Therapist will provide motivational interviewing, psychoeducation, skills training, CBT, and behavioral activation strategies for managing symptoms of depression.     Objective #B  Patient will identify three distraction and diversion activities and use those activities to decrease level of anxiety  .  She will increase coping skills to manage anxiety.   Status: New - Date:  1/22/21     Intervention(s)  Therapist will provide motivational interviewing, skills training, and mindfulness strategies for managing symptoms of anxiety.     Objective #C  Patient will Increase interest, engagement, and pleasure in doing things.  Status: New - Date: 1/22/21     Intervention(s)  Therapist will provide motivational interviewing for participating in activities of interest and completing housework.     This completed plan will be reviewed/updated with patient at the next session.  The goals were developed based on identified needs.     HERMELINDO Keller  January 22, 2021

## 2021-03-26 ENCOUNTER — VIRTUAL VISIT (OUTPATIENT)
Dept: PSYCHOLOGY | Facility: CLINIC | Age: 56
End: 2021-03-26
Payer: COMMERCIAL

## 2021-03-26 DIAGNOSIS — F33.9 MAJOR DEPRESSIVE DISORDER, RECURRENT EPISODE WITH ANXIOUS DISTRESS (H): Primary | ICD-10-CM

## 2021-03-26 PROCEDURE — 90834 PSYTX W PT 45 MINUTES: CPT | Mod: GT | Performed by: SOCIAL WORKER

## 2021-03-26 NOTE — PROGRESS NOTES
Progress Note    Patient Name: Gilma Hargrove  Date: 3/26/21         Service Type: Individual      Session Start Time: 9:07am  Session End Time: 10:00am     Session Length: 53 minutes     Session #: 7    Attendees: Client attended alone    Service Modality:  Video Visit:      Provider verified identity through the following two step process.  Patient provided:  Patient photo    Telemedicine Visit: The patient's condition can be safely assessed and treated via synchronous audio and visual telemedicine encounter.      Reason for Telemedicine Visit: Services only offered telehealth    Originating Site (Patient Location): Patient's home    Distant Site (Provider Location): Provider Remote Setting    Consent:  The patient/guardian has verbally consented to: the potential risks and benefits of telemedicine (video visit) versus in person care; bill my insurance or make self-payment for services provided; and responsibility for payment of non-covered services.     Patient would like the video invitation sent by:  Text to cell phone: [phone]    Mode of Communication:  Video Conference via Amwell    As the provider I attest to compliance with applicable laws and regulations related to telemedicine.     Treatment Plan Last Reviewed: 3/26/21  PHQ-9 / FAYE-7 : PHQ-9:  7/6    DATA  Interactive Complexity: No  Crisis: No       Progress Since Last Session (Related to Symptoms / Goals / Homework):   Symptoms:  She feels less anxious about returning to work due to having her covid-19 vaccine.      Homework: Achieved / completed to satisfaction      Episode of Care Goals: Satisfactory progress - PREPARATION (Decided to change - considering how); Intervened by negotiating a change plan and determining options / strategies for behavior change, identifying triggers, exploring social supports, and working towards setting a date to begin behavior change     Current / Ongoing Stressors and  Concerns:   Covid-19 pandemic   She has low motivation to complete housework.    She has grief due to loss of mother.    Anxiety/fear of dying      Treatment Objective(s) Addressed in This Session:   Patient will Identify negative self-talk and behaviors: challenge core beliefs, myths, and actions.  She will increase coping skills to manage depression.   Patient will Increase interest, engagement, and pleasure in doing things.     Intervention:   Provided engagement regarding progress since the last session.  Provided reflective listening regarding her concerns, validated feelings, engaged with her regarding returning to work.         ASSESSMENT: Current Emotional / Mental Status (status of significant symptoms):   Risk status (Self / Other harm or suicidal ideation)   Patient denies current fears or concerns for personal safety.   Patient denies current or recent suicidal ideation or behaviors.   Patient denies current or recent homicidal ideation or behaviors.   Patient denies current or recent self injurious behavior or ideation.   Patient denies other safety concerns.   Patient reports there has been no change in risk factors since their last session.     Patient reports there has been no change in protective factors since their last session.     Recommended that patient call 911 or go to the local ED should there be a change in any of these risk factors.     Appearance:   Appropriate    Eye Contact:   Good    Psychomotor Behavior: Normal    Attitude:   Cooperative  Pleasant   Orientation:   All   Speech    Rate / Production: Normal/ Responsive Normal     Volume:  Normal    Mood:    Normal during session   Affect:    Appropriate    Thought Content:  Clear    Thought Form:  Coherent  Logical    Insight:    Good      Medication Review:   No changes to current psychiatric medication(s)     Medication Compliance:   Yes     Changes in Health Issues:   None reported     Chemical Use Review:   Substance Use: no  concerns noted      Tobacco Use: No current tobacco use.      Diagnosis:  296.31 (F33.0) Major Depressive Disorder, Recurrent Episode, Mild and With anxious distress    Collateral Reports Completed:   Not Applicable    PLAN: (Patient Tasks / Therapist Tasks / Other)  Patient is encouraged to consider to take time to rest and plan self care.         Magdalena Lopez, [facility]                                                         ______________________________________________________________________    Treatment Plan    Patient's Name: Gilma Hargrove  YOB: 1965    Date: 1/22/21    DSM5 Diagnoses: 296.31 (F33.0) Major Depressive Disorder, Recurrent Episode, Mild and With anxious distress  Psychosocial / Contextual Factors: Patient is experiencing grief and is adjusting to changes related to the covid-19 pandemic.   WHODAS: 16    Referral / Collaboration:  Referral to another professional/service is not indicated at this time.    Anticipated number of session or this episode of care: 12      Measurable Treatment Goal(s) related to diagnosis / functional impairment(s)  Goal 1: Patient will increase coping skills to manage depression and anxiety.       Objective #A (Patient Action)    Patient will Identify negative self-talk and behaviors: challenge core beliefs, myths, and actions.  She will increase coping skills to manage depression.   Status: New - Date: 1/22/21     Intervention(s)  Therapist will provide motivational interviewing, psychoeducation, skills training, CBT, and behavioral activation strategies for managing symptoms of depression.     Objective #B  Patient will identify three distraction and diversion activities and use those activities to decrease level of anxiety  .  She will increase coping skills to manage anxiety.   Status: New - Date: 1/22/21     Intervention(s)  Therapist will provide motivational interviewing, skills training, and mindfulness strategies for managing symptoms of anxiety.      Objective #C  Patient will Increase interest, engagement, and pleasure in doing things.  Status: New - Date: 1/22/21     Intervention(s)  Therapist will provide motivational interviewing for participating in activities of interest and completing housework.     This completed plan will be reviewed/updated with patient at the next session.  The goals were developed based on identified needs.     HERMELINDO Keller  January 22, 2021

## 2021-04-26 ENCOUNTER — VIRTUAL VISIT (OUTPATIENT)
Dept: PSYCHOLOGY | Facility: CLINIC | Age: 56
End: 2021-04-26
Payer: COMMERCIAL

## 2021-04-26 DIAGNOSIS — F33.9 MAJOR DEPRESSIVE DISORDER, RECURRENT EPISODE WITH ANXIOUS DISTRESS (H): Primary | ICD-10-CM

## 2021-04-26 PROCEDURE — 90834 PSYTX W PT 45 MINUTES: CPT | Mod: GT | Performed by: SOCIAL WORKER

## 2021-04-26 NOTE — PROGRESS NOTES
Progress Note    Patient Name: Gilma Hargrove  Date: 4/26/21         Service Type: Individual      Session Start Time: 2:30pm  Session End Time: 3:30pm     Session Length: 60 minutes     Session #: 8    Attendees: Client attended alone    Service Modality:  Video Visit:      Provider verified identity through the following two step process.  Patient provided:  Patient is known previously to provider    Telemedicine Visit: The patient's condition can be safely assessed and treated via synchronous audio and visual telemedicine encounter.      Reason for Telemedicine Visit: Services only offered telehealth    Originating Site (Patient Location): Patient's home    Distant Site (Provider Location): Provider Remote Setting    Consent:  The patient/guardian has verbally consented to: the potential risks and benefits of telemedicine (video visit) versus in person care; bill my insurance or make self-payment for services provided; and responsibility for payment of non-covered services.     Patient would like the video invitation sent by:  My Chart    Mode of Communication:  Video Conference via Amwell    As the provider I attest to compliance with applicable laws and regulations related to telemedicine.     Treatment Plan Last Reviewed: 4/26/21  PHQ-9 / FAYE-7 : PHQ-9:  7/6    DATA  Interactive Complexity: No  Crisis: No       Progress Since Last Session (Related to Symptoms / Goals / Homework):   Symptoms:  She feels less anxious about returning to work, due to the covid vaccine.      Homework: Achieved / completed to satisfaction      Episode of Care Goals: Satisfactory progress - ACTION (Actively working towards change); Intervened by reinforcing change plan / affirming steps taken     Current / Ongoing Stressors and Concerns:   Covid-19 pandemic   She has low motivation to complete housework.    She has grief due to loss of mother.    Anxiety/fear of dying      Treatment  Objective(s) Addressed in This Session:   Patient will Identify negative self-talk and behaviors: challenge core beliefs, myths, and actions.  She will increase coping skills to manage depression.   Patient will Increase interest, engagement, and pleasure in doing things.     Intervention:   Provided engagement regarding progress since the last session.  Provided reflective listening, validation, and discussed transition plan to new therapist.         ASSESSMENT: Current Emotional / Mental Status (status of significant symptoms):   Risk status (Self / Other harm or suicidal ideation)   Patient denies current fears or concerns for personal safety.   Patient denies current or recent suicidal ideation or behaviors.   Patient denies current or recent homicidal ideation or behaviors.   Patient denies current or recent self injurious behavior or ideation.   Patient denies other safety concerns.   Patient reports there has been no change in risk factors since their last session.     Patient reports there has been no change in protective factors since their last session.     Recommended that patient call 911 or go to the local ED should there be a change in any of these risk factors.     Appearance:   Appropriate    Eye Contact:   Good    Psychomotor Behavior: Normal    Attitude:   Cooperative  Pleasant   Orientation:   All   Speech    Rate / Production: Normal/ Responsive Normal     Volume:  Normal    Mood:    Normal during session   Affect:    Appropriate    Thought Content:  Clear    Thought Form:  Coherent  Logical    Insight:    Good      Medication Review:   No changes to current psychiatric medication(s)     Medication Compliance:   Yes     Changes in Health Issues:   None reported     Chemical Use Review:   Substance Use: no concerns noted      Tobacco Use: No current tobacco use.      Diagnosis:  296.31 (F33.0) Major Depressive Disorder, Recurrent Episode, Mild and With anxious distress    Collateral Reports  Completed:   Not Applicable    PLAN: (Patient Tasks / Therapist Tasks / Other)  Patient is encouraged to continue practicing self care.           Magdalenaabe Calderonal, LICSW                                                         ______________________________________________________________________    Treatment Plan    Patient's Name: Gilma Hargrove  YOB: 1965    Date: 1/22/21    DSM5 Diagnoses: 296.31 (F33.0) Major Depressive Disorder, Recurrent Episode, Mild and With anxious distress  Psychosocial / Contextual Factors: Patient is experiencing grief and is adjusting to changes related to the covid-19 pandemic.   WHODAS: 16    Referral / Collaboration:  Referral to another professional/service is not indicated at this time.    Anticipated number of session or this episode of care: 12      Measurable Treatment Goal(s) related to diagnosis / functional impairment(s)  Goal 1: Patient will increase coping skills to manage depression and anxiety.       Objective #A (Patient Action)    Patient will Identify negative self-talk and behaviors: challenge core beliefs, myths, and actions.  She will increase coping skills to manage depression.   Status: New - Date: 1/22/21     Intervention(s)  Therapist will provide motivational interviewing, psychoeducation, skills training, CBT, and behavioral activation strategies for managing symptoms of depression.     Objective #B  Patient will identify three distraction and diversion activities and use those activities to decrease level of anxiety  .  She will increase coping skills to manage anxiety.   Status: New - Date: 1/22/21     Intervention(s)  Therapist will provide motivational interviewing, skills training, and mindfulness strategies for managing symptoms of anxiety.     Objective #C  Patient will Increase interest, engagement, and pleasure in doing things.  Status: New - Date: 1/22/21     Intervention(s)  Therapist will provide motivational interviewing for  participating in activities of interest and completing housework.     This completed plan will be reviewed/updated with patient at the next session.  The goals were developed based on identified needs.     HERMELNIDO Keller  January 22, 2021

## 2021-04-26 NOTE — LETTER
4/26/2021        RE: Gilma Hargrove  [address] [place] [address]                                                   Progress Note    Patient Name: Gilma Hargrove  Date: 4/26/21         Service Type: Individual      Session Start Time: 2:30pm  Session End Time: 3:30pm     Session Length: 60 minutes     Session #: 8    Attendees: Client attended alone    Service Modality:  Video Visit:      Provider verified identity through the following two step process.  Patient provided:  Patient is known previously to provider    Telemedicine Visit: The patient's condition can be safely assessed and treated via synchronous audio and visual telemedicine encounter.      Reason for Telemedicine Visit: Services only offered telehealth    Originating Site (Patient Location): Patient's home    Distant Site (Provider Location): Provider Remote Setting    Consent:  The patient/guardian has verbally consented to: the potential risks and benefits of telemedicine (video visit) versus in person care; bill my insurance or make self-payment for services provided; and responsibility for payment of non-covered services.     Patient would like the video invitation sent by:  My Chart    Mode of Communication:  Video Conference via [facility]    As the provider I attest to compliance with applicable laws and regulations related to telemedicine.     Treatment Plan Last Reviewed: 4/26/21  PHQ-9 / FAYE-7 : PHQ-9:  7/6    DATA  Interactive Complexity: No  Crisis: No       Progress Since Last Session (Related to Symptoms / Goals / Homework):   Symptoms:  She feels less anxious about returning to work, due to the covid vaccine.      Homework: Achieved / completed to satisfaction      Episode of Care Goals: Satisfactory progress - ACTION (Actively working towards change); Intervened by reinforcing change plan / affirming steps taken     Current / Ongoing Stressors and Concerns:   Covid-19 pandemic   She has low motivation to complete housework.     She has grief due to loss of mother.    Anxiety/fear of dying      Treatment Objective(s) Addressed in This Session:   Patient will Identify negative self-talk and behaviors: challenge core beliefs, myths, and actions.  She will increase coping skills to manage depression.   Patient will Increase interest, engagement, and pleasure in doing things.     Intervention:   Provided engagement regarding progress since the last session.  Provided reflective listening, validation, and discussed transition plan to new therapist.         ASSESSMENT: Current Emotional / Mental Status (status of significant symptoms):   Risk status (Self / Other harm or suicidal ideation)   Patient denies current fears or concerns for personal safety.   Patient denies current or recent suicidal ideation or behaviors.   Patient denies current or recent homicidal ideation or behaviors.   Patient denies current or recent self injurious behavior or ideation.   Patient denies other safety concerns.   Patient reports there has been no change in risk factors since their last session.     Patient reports there has been no change in protective factors since their last session.     Recommended that patient call 911 or go to the local ED should there be a change in any of these risk factors.     Appearance:   Appropriate    Eye Contact:   Good    Psychomotor Behavior: Normal    Attitude:   Cooperative  Pleasant   Orientation:   All   Speech    Rate / Production: Normal/ Responsive Normal     Volume:  Normal    Mood:    Normal during session   Affect:    Appropriate    Thought Content:  Clear    Thought Form:  Coherent  Logical    Insight:    Good      Medication Review:   No changes to current psychiatric medication(s)     Medication Compliance:   Yes     Changes in Health Issues:   None reported     Chemical Use Review:   Substance Use: no concerns noted      Tobacco Use: No current tobacco use.      Diagnosis:  296.31 (F33.0) Major Depressive  Disorder, Recurrent Episode, Mild and With anxious distress    Collateral Reports Completed:   Not Applicable    PLAN: (Patient Tasks / Therapist Tasks / Other)  Patient is encouraged to continue practicing self care.           Magdalena Lopez, [facility]                                                         ______________________________________________________________________    Treatment Plan    Patient's Name: Gilma Hargrove  YOB: 1965    Date: 1/22/21    DSM5 Diagnoses: 296.31 (F33.0) Major Depressive Disorder, Recurrent Episode, Mild and With anxious distress  Psychosocial / Contextual Factors: Patient is experiencing grief and is adjusting to changes related to the covid-19 pandemic.   WHODAS: 16    Referral / Collaboration:  Referral to another professional/service is not indicated at this time.    Anticipated number of session or this episode of care: 12      Measurable Treatment Goal(s) related to diagnosis / functional impairment(s)  Goal 1: Patient will increase coping skills to manage depression and anxiety.       Objective #A (Patient Action)    Patient will Identify negative self-talk and behaviors: challenge core beliefs, myths, and actions.  She will increase coping skills to manage depression.   Status: New - Date: 1/22/21     Intervention(s)  Therapist will provide motivational interviewing, psychoeducation, skills training, CBT, and behavioral activation strategies for managing symptoms of depression.     Objective #B  Patient will identify three distraction and diversion activities and use those activities to decrease level of anxiety  .  She will increase coping skills to manage anxiety.   Status: New - Date: 1/22/21     Intervention(s)  Therapist will provide motivational interviewing, skills training, and mindfulness strategies for managing symptoms of anxiety.     Objective #C  Patient will Increase interest, engagement, and pleasure in doing things.  Status: New - Date: 1/22/21      Intervention(s)  Therapist will provide motivational interviewing for participating in activities of interest and completing housework.     This completed plan will be reviewed/updated with patient at the next session.  The goals were developed based on identified needs.     HERMELINDO Keller  January 22, 2021                                                                                          Referral/Transfer of an [facility] Client to Another [facility] Therapist    CLIENT'S NAME: Gilma Hargrove  DATE of Referral/Transfer Request:  April 27, 2021    Referral/Transfer Request Information    Transfer for the same service: Therapist Initiated    Client Phone #:  [phone] (home) [phone] (work)       Telephone Information:   Mobile [phone]         Facilitating Referral/Transfer: [facility] intake staff     Current Therapist: HERMELINDO Keller      Therapist Receiving Referral/Transfer: Mykel Farmer    Referral/Transfer is for: Individual    Rationale for Referral/Transfer: (to be completed by [facility] staff person initiating the referral)  Situation: (What is going on with the client?)  Patient has a history of anxiety and depression.  Her symptoms increased during the covid-19 pandemic. She works as a  and is working to manage anxiety related to changes at work and the pandemic.       Background: (What is the clinical background or context?)  History of anxiety and depression, low motivation to complete tasks       Assessment: (What do you think the problem is?)   Stress, grief at loss of mother, pandemic       Recommendation: (What would you do to correct it?)  Outpatient therapy       Referral/Transfer Status:    Therapist Initiated Referral:  Therapist receiving referral has been informed of and has accepted the referral/Was routed this form in an inbasket message  New service has been scheduled?  Yes  Referral/Transfer Completion Date: 4/27/21.  Completed by: Magdalena Lopez        HERMELINDO Keller  April 27, 2021                  Sincerely,        HERMELINDO Keller

## 2021-04-27 NOTE — PROGRESS NOTES
Referral/Transfer of an [facility] Client to Another [facility] Therapist    CLIENT'S NAME: Gilma Hargrove  DATE of Referral/Transfer Request:  April 27, 2021    Referral/Transfer Request Information    Transfer for the same service: Therapist Initiated    Client Phone #:  [phone] (home) [phone] (work)       Telephone Information:   Mobile [phone]         Facilitating Referral/Transfer: [facility] intake staff     Current Therapist: HERMELINDO Keller      Therapist Receiving Referral/Transfer: Mykel Farmer    Referral/Transfer is for: Individual    Rationale for Referral/Transfer: (to be completed by [facility] staff person initiating the referral)  Situation: (What is going on with the client?)  Patient has a history of anxiety and depression.  Her symptoms increased during the covid-19 pandemic. She works as a  and is working to manage anxiety related to changes at work and the pandemic.       Background: (What is the clinical background or context?)  History of anxiety and depression, low motivation to complete tasks       Assessment: (What do you think the problem is?)   Stress, grief at loss of mother, pandemic       Recommendation: (What would you do to correct it?)  Outpatient therapy       Referral/Transfer Status:    Therapist Initiated Referral:  Therapist receiving referral has been informed of and has accepted the referral/Was routed this form in an in[employer] message  New service has been scheduled?  Yes  Referral/Transfer Completion Date: 4/27/21.  Completed by: HERMELINDO Mcdowell  April 27, 2021

## 2021-05-10 ENCOUNTER — VIRTUAL VISIT (OUTPATIENT)
Dept: PSYCHOLOGY | Facility: CLINIC | Age: 56
End: 2021-05-10
Payer: COMMERCIAL

## 2021-05-10 DIAGNOSIS — F33.9 MAJOR DEPRESSIVE DISORDER, RECURRENT EPISODE WITH ANXIOUS DISTRESS (H): Primary | ICD-10-CM

## 2021-05-10 PROCEDURE — 90834 PSYTX W PT 45 MINUTES: CPT | Mod: GT | Performed by: MARRIAGE & FAMILY THERAPIST

## 2021-05-10 NOTE — PROGRESS NOTES
Progress Note    Patient Name: Gilma Hargrove  Date: 5/10/21         Service Type: Individual      Session Start Time: 1:02p  Session End Time: 1:54p     Session Length: 52    Session #: 9    Attendees: Client attended alone    Service Modality:  Video Visit:      Provider verified identity through the following two step process.  Patient provided:  Patient     Telemedicine Visit: The patient's condition can be safely assessed and treated via synchronous audio and visual telemedicine encounter.      Reason for Telemedicine Visit: Services only offered telehealth    Originating Site (Patient Location): Patient's home    Distant Site (Provider Location): Provider Remote Setting    Consent:  The patient/guardian has verbally consented to: the potential risks and benefits of telemedicine (video visit) versus in person care; bill my insurance or make self-payment for services provided; and responsibility for payment of non-covered services.     Patient would like the video invitation sent by:  Send to e-mail at: [email or web address]    Mode of Communication:  Video Conference via [facility]    As the provider I attest to compliance with applicable laws and regulations related to telemedicine.     Treatment Plan Last Reviewed: 5/10/21  PHQ-9 / FAYE-7 :     DATA  Interactive Complexity: No  Crisis: No       Progress Since Last Session (Related to Symptoms / Goals / Homework):   Symptoms: Improving Mood at 6/10    Homework: Completed in session      Episode of Care Goals: Satisfactory progress - ACTION (Actively working towards change); Intervened by reinforcing change plan / affirming steps taken     Current / Ongoing Stressors and Concerns:   First session with pt after transfer from [facility].  Pt had been working with [facility] provider since Dec 2020, pt has lost both of her parent over the past year, not specifically related to COVID. 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   -She feels like she is a higher functioning depressed person with anxiety. The sx she has been dealing with have been ongoing for many years but have been exasperated over the past year.   -Current stressors:  Feeling highly burned out from teaching and having low self-care time. Sleep has been poor due to stress from her job and some ongoing medical issues with her thyroid. Pt notes having low motivation and \"pockets\" of happiness where she can disconnect with her job. PT's current coping skills: playing with her cats; listening to some music.      Treatment Objective(s) Addressed in This Session: Grief workbook part 1     Intervention:   Motivational Interviewing    MI Intervention: Supported Autonomy, Collaboration, Evocation, Permission to raise concern or advise, Open-ended questions and Reflections: simple and complex     Change Talk Expressed by the Patient: Need to change Committment to change    Provider Response to Change Talk: E - Evoked more info from patient about behavior change, A - Affirmed patient's thoughts, decisions, or attempts at behavior change and R - Reflected patient's change talk          ASSESSMENT: Current Emotional / Mental Status (status of significant symptoms):   Risk status (Self / Other harm or suicidal ideation)   Patient denies current fears or concerns for personal safety.   Patient denies current or recent suicidal ideation or behaviors.   Patient denies current or recent homicidal ideation or behaviors.   Patient denies current or recent self injurious behavior or ideation.   Patient denies other safety concerns.   Patient reports there has been no change in risk factors since their last session.     Patient reports there has been no change in protective factors since their last session.     Recommended that patient call 911 " or go to the local ED should there be a change in any of these risk factors.     Appearance:   Appropriate    Eye Contact:   Good    Psychomotor Behavior: Normal    Attitude:   Cooperative    Orientation:   All   Speech    Rate / Production: Normal     Volume:  Normal    Mood:    Normal   Affect:    Appropriate    Thought Content:  Clear    Thought Form:  Coherent  Logical    Insight:    Good      Medication Review:   No changes to current psychiatric medication(s)     Medication Compliance:   Yes     Changes in Health Issues:   None reported     Chemical Use Review:   Substance Use: Chemical use reviewed, no active concerns identified      Tobacco Use: No current tobacco use.      Diagnosis:  MDD, recurrent, mild with anxious features    Collateral Reports Completed:   Not Applicable    PLAN: (Patient Tasks / Therapist Tasks / Other)  PT will complete and review part 1 of grief workbook        Mykel Farmer, YEMI     5/10/21                                                         ______________________________________________________________________    Treatment Plan     Patient's Name: Gilma Hargrove                       YOB: 1965     Date: 5/10/21     DSM5 Diagnoses: 296.31 (F33.0) Major Depressive Disorder, Recurrent Episode, Mild and With anxious distress  Psychosocial / Contextual Factors: Patient is experiencing grief and is adjusting to changes related to the covid-19 pandemic.   WHODAS: 16     Referral / Collaboration:  Referral to another professional/service is not indicated at this time.     Anticipated number of session or this episode of care: 12        Measurable Treatment Goal(s) related to diagnosis / functional impairment(s)  Goal 1: Patient will increase coping skills to manage depression and anxiety.         Objective #A (Patient Action)                          Patient will Identify negative self-talk and behaviors: challenge core beliefs, myths, and actions.  She will  increase coping skills to manage depression.   Status: New - Date: 1/22/21      Intervention(s)  Therapist will provide motivational interviewing, psychoeducation, skills training, CBT, and behavioral activation strategies for managing symptoms of depression.      Objective #B  Patient will identify three distraction and diversion activities and use those activities to decrease level of anxiety  .  She will increase coping skills to manage anxiety.   Status: New - Date: 1/22/21      Intervention(s)  Therapist will provide motivational interviewing, skills training, and mindfulness strategies for managing symptoms of anxiety.      Objective #C  Patient will Increase interest, engagement, and pleasure in doing things.  Status: New - Date: 1/22/21      Intervention(s)  Therapist will provide motivational interviewing for participating in activities of interest and completing housework.      This completed plan will be reviewed/updated with patient at the next session.  The goals were developed based on identified needs.      Mykel Farmer, BO  5/10/21

## 2021-09-11 ENCOUNTER — HEALTH MAINTENANCE LETTER (OUTPATIENT)
Age: 56
End: 2021-09-11

## 2021-09-27 DIAGNOSIS — E03.9 ACQUIRED HYPOTHYROIDISM: ICD-10-CM

## 2021-09-27 RX ORDER — LEVOTHYROXINE SODIUM 75 UG/1
75 TABLET ORAL DAILY
Qty: 90 TABLET | Refills: 2 | Status: SHIPPED | OUTPATIENT
Start: 2021-09-27 | End: 2021-11-18

## 2021-10-29 DIAGNOSIS — Z90.721 S/P HYSTERECTOMY WITH OOPHORECTOMY: ICD-10-CM

## 2021-10-29 DIAGNOSIS — Z90.710 S/P HYSTERECTOMY WITH OOPHORECTOMY: ICD-10-CM

## 2021-10-29 RX ORDER — ESTRADIOL 0.5 MG/1
0.5 TABLET ORAL DAILY
Qty: 90 TABLET | Refills: 0 | Status: SHIPPED | OUTPATIENT
Start: 2021-10-29 | End: 2021-11-18

## 2021-10-29 NOTE — TELEPHONE ENCOUNTER
3 month lindsay refill approved.     MA/TC: Please assist patient in scheduling office visit.     Aylin House RN on 10/29/2021 at 10:52 AM

## 2021-10-29 NOTE — TELEPHONE ENCOUNTER
Telephone Call:     Immunizations needed: Pt would like to schedule her Flu shot and pfizer booster vaccine. 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     Refill request: Pt is also needing a refill of her Estradiol medication. She has had virtual appts but not an in-person appointment in the last year.     Last Written Prescription Date:  9/25/2020  Last Fill Quantity: 90,  # refills: 3   Last office visit provider:  5/10/2021     Requested Prescriptions   Pending Prescriptions Disp Refills     estradiol (ESTRACE) 0.5 MG tablet 90 tablet 3     Sig: Take 1 tablet (0.5 mg) by mouth daily       There is no refill protocol information for this order        Pt transferred to scheduling for immunization appt. Routing message to PCP for refill.      Amy Hoang RN 10/29/21 8:34 AM

## 2021-11-18 ENCOUNTER — VIRTUAL VISIT (OUTPATIENT)
Dept: FAMILY MEDICINE | Facility: CLINIC | Age: 56
End: 2021-11-18
Payer: COMMERCIAL

## 2021-11-18 DIAGNOSIS — Z13.220 LIPID SCREENING: ICD-10-CM

## 2021-11-18 DIAGNOSIS — Z90.721 S/P HYSTERECTOMY WITH OOPHORECTOMY: ICD-10-CM

## 2021-11-18 DIAGNOSIS — Z13.1 SCREENING FOR DIABETES MELLITUS: ICD-10-CM

## 2021-11-18 DIAGNOSIS — Z90.710 S/P HYSTERECTOMY WITH OOPHORECTOMY: ICD-10-CM

## 2021-11-18 DIAGNOSIS — Z82.61 FAMILY HISTORY OF RHEUMATOID ARTHRITIS: ICD-10-CM

## 2021-11-18 DIAGNOSIS — E03.9 ACQUIRED HYPOTHYROIDISM: Primary | ICD-10-CM

## 2021-11-18 DIAGNOSIS — Z12.31 ENCOUNTER FOR SCREENING MAMMOGRAM FOR BREAST CANCER: ICD-10-CM

## 2021-11-18 PROCEDURE — 99214 OFFICE O/P EST MOD 30 MIN: CPT | Mod: TEL | Performed by: FAMILY MEDICINE

## 2021-11-18 RX ORDER — ESTRADIOL 0.5 MG/1
0.5 TABLET ORAL DAILY
Qty: 90 TABLET | Refills: 3 | Status: SHIPPED | OUTPATIENT
Start: 2021-11-18 | End: 2022-08-12

## 2021-11-18 RX ORDER — LEVOTHYROXINE SODIUM 75 UG/1
75 TABLET ORAL DAILY
Qty: 90 TABLET | Refills: 3 | Status: SHIPPED | OUTPATIENT
Start: 2021-11-18 | End: 2022-08-12

## 2021-11-18 NOTE — PROGRESS NOTES
Gilma is a 55 year old who is being evaluated via a billable telephone visit.      What phone number would you like to be contacted at? [phone]  How would you like to obtain your AVS? MyChart      Assessment & Plan     Acquired hypothyroidism - surveillance labs near future  - TSH; Future  - T4, free; Future  - levothyroxine (SYNTHROID/LEVOTHROID) 75 MCG tablet; Take 1 tablet (75 mcg) by mouth daily  Dispense: 90 tablet; Refill: 3    S/P hysterectomy with oophorectomy - on HRT, will continue. Upcoming mammo in Dec.   - estradiol (ESTRACE) 0.5 MG tablet; Take 1 tablet (0.5 mg) by mouth daily  Dispense: 90 tablet; Refill: 3    Encounter for screening mammogram for breast cancer  - MA Screen Bilateral w/Logan; Future    Lipid screening  - Lipid panel reflex to direct LDL Fasting; Future    Screening for diabetes mellitus  - Hemoglobin A1c; Future    Family history of rheumatoid arthritis - labs as below.   - Rheumatoid factor; Future  - Cyclic Citrullinated Peptide Antibody IgG; Future  - ESR: Erythrocyte sedimentation rate; Future    Return in about 1 year (around 11/18/2022) for Medication Recheck.    Chyna Blunt MD  [facility]   Gilma is a 55 year old who presents for the following health issues     HPI     Medication Followup of estradiol     Taking Medication as prescribed: yes    Side Effects:  None    Medication Helping Symptoms:  yes       Has been having hot flashes occasionally over the past 4-5 months.     On levothyroxine 75 mcg daily.     FH of RA in her sisters. Notes increasing ankle pain, some in her hands as well. Would like to see if she also has RA.       Review of Systems   Constitutional, HEENT, cardiovascular, pulmonary, gi and gu systems are negative, except as otherwise noted.      Objective           Vitals:  No vitals were obtained today due to virtual visit.    Physical Exam   PSYCH: Alert and oriented times 3; coherent speech, normal    rate and volume, able to articulate logical thoughts, able   to abstract reason, no tangential thoughts, no hallucinations   or delusions  Her affect is normal  RESP: No cough, no audible wheezing, able to talk in full sentences  Remainder of exam unable to be completed due to telephone visits        Phone call duration: 8 minutes

## 2021-12-03 ENCOUNTER — TELEPHONE (OUTPATIENT)
Dept: FAMILY MEDICINE | Facility: CLINIC | Age: 56
End: 2021-12-03
Payer: COMMERCIAL

## 2021-12-03 DIAGNOSIS — J32.0 CHRONIC MAXILLARY SINUSITIS: ICD-10-CM

## 2021-12-03 DIAGNOSIS — Z12.11 COLON CANCER SCREENING: ICD-10-CM

## 2021-12-03 DIAGNOSIS — Z12.11 COLON CANCER SCREENING: Primary | ICD-10-CM

## 2021-12-03 NOTE — TELEPHONE ENCOUNTER
Called and LM for patient stating the cologard has been ordered and she will receive this by mail. And for the ENT referral she will need to call to schedule. Phone number given.    [facility]   [id number] [address] [address]   Phone: [phone]     Per Luis for the xrays she can discuss at her next visit, or can schedule visit if feels more urgent.    Tiara Saleem,

## 2021-12-03 NOTE — TELEPHONE ENCOUNTER
"Patient calling stating that cologuard test was discussed during recent VV w/ J.H. 11/18/21. RN reviewed cologuard testing and purpose. Patient would like this test order to be placed.     Patient wants J.H. to know two things that they were not able to discuss during visit because the visit was cut off. \"I have chronic sinus issues, i'm wondering if I could get a referral to see an ENT for this. 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     \"I got a notice from my previous caregiver from before I moved back to [place]. 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 This isn't urgent, just wanting to know what I should do about this\".     Please review and advise.     Luis ROQUE RN      "

## 2021-12-05 ENCOUNTER — LAB (OUTPATIENT)
Dept: LAB | Facility: CLINIC | Age: 56
End: 2021-12-05
Payer: COMMERCIAL

## 2021-12-05 DIAGNOSIS — Z13.1 SCREENING FOR DIABETES MELLITUS: ICD-10-CM

## 2021-12-05 DIAGNOSIS — E03.9 ACQUIRED HYPOTHYROIDISM: ICD-10-CM

## 2021-12-05 DIAGNOSIS — Z13.220 LIPID SCREENING: ICD-10-CM

## 2021-12-05 DIAGNOSIS — Z82.61 FAMILY HISTORY OF RHEUMATOID ARTHRITIS: ICD-10-CM

## 2021-12-05 LAB
ERYTHROCYTE [SEDIMENTATION RATE] IN BLOOD BY WESTERGREN METHOD: 20 MM/HR (ref 0–30)
HBA1C MFR BLD: 5.6 % (ref 0–5.6)

## 2021-12-05 PROCEDURE — 84443 ASSAY THYROID STIM HORMONE: CPT

## 2021-12-05 PROCEDURE — 86200 CCP ANTIBODY: CPT

## 2021-12-05 PROCEDURE — 36415 COLL VENOUS BLD VENIPUNCTURE: CPT

## 2021-12-05 PROCEDURE — 83036 HEMOGLOBIN GLYCOSYLATED A1C: CPT

## 2021-12-05 PROCEDURE — 85652 RBC SED RATE AUTOMATED: CPT

## 2021-12-05 PROCEDURE — 86431 RHEUMATOID FACTOR QUANT: CPT

## 2021-12-05 PROCEDURE — 80061 LIPID PANEL: CPT

## 2021-12-05 PROCEDURE — 84439 ASSAY OF FREE THYROXINE: CPT

## 2021-12-06 LAB
T4 FREE SERPL-MCNC: 0.97 NG/DL (ref 0.76–1.46)
TSH SERPL DL<=0.005 MIU/L-ACNC: 1.78 MU/L (ref 0.4–4)

## 2021-12-07 LAB
CCP AB SER IA-ACNC: 2 U/ML
RHEUMATOID FACT SER NEPH-ACNC: <7 IU/ML

## 2021-12-07 NOTE — PROGRESS NOTES
This encounter was created solely for the purpose of releasing the future order that was placed for Cologuard.  This is a necessary step in order for the results to be abstracted once they are available.  Aleyda Enriquez

## 2021-12-15 LAB
CHOLEST SERPL-MCNC: 242 MG/DL
FASTING STATUS PATIENT QL REPORTED: ABNORMAL
HDLC SERPL-MCNC: 64 MG/DL
LDLC SERPL CALC-MCNC: 145 MG/DL
NONHDLC SERPL-MCNC: 178 MG/DL
TRIGL SERPL-MCNC: 166 MG/DL

## 2022-01-01 ENCOUNTER — HEALTH MAINTENANCE LETTER (OUTPATIENT)
Age: 57
End: 2022-01-01

## 2022-01-03 ENCOUNTER — NURSE TRIAGE (OUTPATIENT)
Dept: FAMILY MEDICINE | Facility: CLINIC | Age: 57
End: 2022-01-03
Payer: COMMERCIAL

## 2022-01-03 NOTE — TELEPHONE ENCOUNTER
"SEE TODAY IN OFFICE:    You need to be seen in the Urgent Care Center. Go to the one at [place]. Go there today, now.    Reason for Disposition    Using nasal washes and pain medicine > 24 hours and sinus pain persists    Additional Information    Negative: Bluish (or gray) lips or face    Negative: Severe difficulty breathing (e.g., struggling for each breath, speaks in single words)    Negative: Rapid onset of cough and has hives    Negative: Coughing started suddenly after medicine, an allergic food or bee sting    Negative: Difficulty breathing after exposure to flames, smoke, or fumes    Negative: Sounds like a life-threatening emergency to the triager    Negative: Previous asthma attacks and this feels like asthma attack    Negative: Chest pain present when not coughing    Negative: Difficulty breathing    Negative: Passed out (i.e., fainted, collapsed and was not responding)    Negative: Patient sounds very sick or weak to the triager    Negative: Coughed up > 1 tablespoon (15 ml) blood (Exception: blood-tinged sputum)    Negative: Fever > 103 F (39.4 C)    Negative: Fever > 101 F (38.3 C) and over 60 years of age    Negative: Fever > 100.0 F (37.8 C) and has diabetes mellitus or a weak immune system (e.g., HIV positive, cancer chemotherapy, organ transplant, splenectomy, chronic steroids)    Negative: Fever > 100.0 F (37.8 C) and bedridden (e.g., nursing home patient, stroke, chronic illness, recovering from surgery)    Negative: Increasing ankle swelling    Negative: Wheezing is present    Answer Assessment - Initial Assessment Questions  1. ONSET: \"When did the cough begin?\"       On and off for one week, worse for last week.   2. SEVERITY: \"How bad is the cough today?\"       Has some mucus, worsening  3. RESPIRATORY DISTRESS: \"Describe your breathing.\"       Chest feels heavy, feeling short of breath faster with exertion.  4. FEVER: \"Do you have a fever?\" If so, ask: \"What is your temperature, how was " "it measured, and when did it start?\"      No, negative covid test today  5. HEMOPTYSIS: \"Are you coughing up any blood?\" If so ask: \"How much?\" (flecks, streaks, tablespoons, etc.)      Nose bleeds not from chest.  6. TREATMENT: \"What have you done so far to treat the cough?\" (e.g., meds, fluids, humidifier)      Nasal spray, mucinex, zyrtec,and ibuprofen.   7. CARDIAC HISTORY: \"Do you have any history of heart disease?\" (e.g., heart attack, congestive heart failure)       No, some hypertension in past  8. LUNG HISTORY: \"Do you have any history of lung disease?\"  (e.g., pulmonary embolus, asthma, emphysema)      Asthma with exertion in past  9. PE RISK FACTORS: \"Do you have a history of blood clots?\" (or: recent major surgery, recent prolonged travel, bedridden)      No   10. OTHER SYMPTOMS: \"Do you have any other symptoms? (e.g., runny nose, wheezing, chest pain)        Runny nose, sinus pressure, ear pressure feels like a sinus infection.  11. PREGNANCY: \"Is there any chance you are pregnant?\" \"When was your last menstrual period?\"        no  12. TRAVEL: \"Have you traveled out of the country in the last month?\" (e.g., travel history, exposures)        no    Protocols used: COUGH-A-OH      "

## 2022-02-04 ENCOUNTER — HOSPITAL ENCOUNTER (OUTPATIENT)
Dept: MAMMOGRAPHY | Facility: CLINIC | Age: 57
Discharge: HOME | End: 2022-02-04
Attending: FAMILY MEDICINE | Admitting: FAMILY MEDICINE
Payer: COMMERCIAL

## 2022-02-04 DIAGNOSIS — Z12.31 ENCOUNTER FOR SCREENING MAMMOGRAM FOR BREAST CANCER: ICD-10-CM

## 2022-02-04 PROCEDURE — 77067 SCR MAMMO BI INCL CAD: CPT

## 2022-03-04 ENCOUNTER — VIRTUAL VISIT (OUTPATIENT)
Dept: FAMILY MEDICINE | Facility: CLINIC | Age: 57
End: 2022-03-04
Payer: COMMERCIAL

## 2022-03-04 DIAGNOSIS — B96.89 ACUTE BACTERIAL RHINOSINUSITIS: Primary | ICD-10-CM

## 2022-03-04 DIAGNOSIS — J01.90 ACUTE BACTERIAL RHINOSINUSITIS: Primary | ICD-10-CM

## 2022-03-04 DIAGNOSIS — Z12.11 SPECIAL SCREENING FOR MALIGNANT NEOPLASMS, COLON: ICD-10-CM

## 2022-03-04 PROCEDURE — 99213 OFFICE O/P EST LOW 20 MIN: CPT | Mod: GT | Performed by: FAMILY MEDICINE

## 2022-03-04 RX ORDER — DOXYCYCLINE 100 MG/1
100 CAPSULE ORAL 2 TIMES DAILY
Qty: 20 CAPSULE | Refills: 0 | Status: SHIPPED | OUTPATIENT
Start: 2022-03-04 | End: 2022-03-14

## 2022-03-04 RX ORDER — FLUCONAZOLE 150 MG/1
150 TABLET ORAL ONCE
Qty: 1 TABLET | Refills: 0 | Status: SHIPPED | OUTPATIENT
Start: 2022-03-04 | End: 2022-03-04

## 2022-03-04 NOTE — PROGRESS NOTES
"Gilma is a 56 year old who is being evaluated via a billable video visit.      How would you like to obtain your AVS? {AVS Preference:967561}  If the video visit is dropped, the invitation should be resent by: {video visit invitation:906496}  Will anyone else be joining your video visit? {:826606}  {If patient encounters technical issues they should call [phone] :356142}    Video Start Time: {video visit start/end time for provider to select:239236}    {PROVIDER CHARTING PREFERENCE:033626}    Subjective   Gilma is a 56 year old who presents for the following health issues {ACCOMPANIED BY STATEMENT (Optional):188376}    HPI     Chronic sinus infection, pressure has become intense, phlegm has become green, possible backlash from over the counter meds      Acute Illness  Acute illness concerns: ***  Onset/Duration: ***  Symptoms:  Fever: {.:776178::\"no\"}  Chills/Sweats: {.:525840::\"no\"}  Headache (location?): {.:911322::\"no\"}  Sinus Pressure: YES  Conjunctivitis:  {.:273265::\"no\"}  Ear Pain: {.:861322::\"no\"}  Rhinorrhea: {.:659965::\"no\"}  Congestion: YES- Green  Sore Throat: {.:662619::\"no\"}  Cough: {.:930161::\"no\"}  Wheeze: {.:786999::\"no\"}  Decreased Appetite: {.:898479::\"no\"}  Nausea: {.:583250::\"no\"}  Vomiting: {.:557588::\"no\"}  Diarrhea: {.:403598::\"no\"}  Dysuria/Freq.: {.:034901::\"no\"}  Dysuria or Hematuria: {.:597424::\"no\"}  Fatigue/Achiness: {.:848265::\"no\"}  Sick/Strep Exposure: {.:787101::\"no\"}  Therapies tried and outcome: {NONEORCHOOSE:874370::\"None\"}  {additonal problems for provider to add (Optional):790952}    Review of Systems   {ROS COMP (Optional):991710}      Objective           Vitals:  No vitals were obtained today due to virtual visit.    Physical Exam   {video visit exam brief selected:475009::\"GENERAL: Healthy, alert and no distress\",\"EYES: Eyes grossly normal to inspection.  No discharge or erythema, or obvious scleral/conjunctival abnormalities.\",\"RESP: No audible wheeze, cough, or " "visible cyanosis.  No visible retractions or increased work of breathing.  \",\"SKIN: Visible skin clear. No significant rash, abnormal pigmentation or lesions.\",\"NEURO: Cranial nerves grossly intact.  Mentation and speech appropriate for age.\",\"PSYCH: Mentation appears normal, affect normal/bright, judgement and insight intact, normal speech and appearance well-groomed.\"}    {Diagnostic Test Results (Optional):294450}    {AMBULATORY ATTESTATION (Optional):287276}        Video-Visit Details    Type of service:  Video Visit    Video End Time:{video visit start/end time for provider to select:851751}    Originating Location (pt. Location): {video visit patient location:948753::\"Home\"}    Distant Location (provider location):  [facility]     Platform used for Video Visit: {Virtual Visit Platforms:283917::\"Allegorithmic\"}  "

## 2022-03-04 NOTE — PROGRESS NOTES
Gilma is a 56 year old who is being evaluated via a billable video visit.      How would you like to obtain your AVS? MyChart  If the video visit is dropped, the invitation should be resent by: Text to cell phone: [phone]  Will anyone else be joining your video visit? No       Video Start Time: 11:02 AM    Assessment & Plan     Acute bacterial rhinosinusitis - recurrent issue, has upcoming ENT consult. Will rx abx to help reduce her symptoms prior to ENT consult. Can continue with OTC options as well.   - doxycycline hyclate (VIBRAMYCIN) 100 MG capsule; Take 1 capsule (100 mg) by mouth 2 times daily for 10 days  - fluconazole (DIFLUCAN) 150 MG tablet; Take 1 tablet (150 mg) by mouth once for 1 dose    Return in about 8 months (around 11/4/2022) for Yearly Preventive Exam, Medication Recheck.    Chyna Blunt MD  [facility]   Gilma is a 56 year old who presents for the following health issues     Sinus Problem     Chronic sinus issues - has appt scheduled with ENT 1.5 weeks.     Over the past week, worsening symptoms. Bilateral maxillary fullness, eye pain, headache.   Has used afrin with good results, but she avoids this due to rebound.   Using mucinex OTC.   No fever.   No upper dental pain.   Negative COVID19.       Answers for HPI/ROS submitted by the patient on 3/4/2022  What is the reason for your visit today?: Sinus issues  When did your symptoms begin?: 3-7 days ago      Review of Systems   Constitutional, HEENT, cardiovascular, pulmonary, gi and gu systems are negative, except as otherwise noted.      Objective           Vitals:  No vitals were obtained today due to virtual visit.    Physical Exam   GENERAL: Healthy, alert and no distress  EYES: Eyes grossly normal to inspection.  No discharge or erythema, or obvious scleral/conjunctival abnormalities.  RESP: No audible wheeze, cough, or visible cyanosis.  No visible retractions or increased work of breathing.     SKIN: Visible skin clear. No significant rash, abnormal pigmentation or lesions.  NEURO: Cranial nerves grossly intact.  Mentation and speech appropriate for age.  PSYCH: Mentation appears normal, affect normal/bright, judgement and insight intact, normal speech and appearance well-groomed.        Video-Visit Details    Type of service:  Video Visit    Video End Time:11:12 AM    Originating Location (pt. Location): Home    Distant Location (provider location):  [facility]     Platform used for Video Visit: [employer]

## 2022-03-14 ENCOUNTER — TRANSFERRED RECORDS (OUTPATIENT)
Dept: HEALTH INFORMATION MANAGEMENT | Facility: CLINIC | Age: 57
End: 2022-03-14
Payer: COMMERCIAL

## 2022-08-11 SDOH — ECONOMIC STABILITY: INCOME INSECURITY: IN THE LAST 12 MONTHS, WAS THERE A TIME WHEN YOU WERE NOT ABLE TO PAY THE MORTGAGE OR RENT ON TIME?: NO

## 2022-08-11 SDOH — ECONOMIC STABILITY: FOOD INSECURITY: WITHIN THE PAST 12 MONTHS, THE FOOD YOU BOUGHT JUST DIDN'T LAST AND YOU DIDN'T HAVE MONEY TO GET MORE.: NEVER TRUE

## 2022-08-11 SDOH — HEALTH STABILITY: PHYSICAL HEALTH: ON AVERAGE, HOW MANY DAYS PER WEEK DO YOU ENGAGE IN MODERATE TO STRENUOUS EXERCISE (LIKE A BRISK WALK)?: 4 DAYS

## 2022-08-11 SDOH — HEALTH STABILITY: PHYSICAL HEALTH: ON AVERAGE, HOW MANY MINUTES DO YOU ENGAGE IN EXERCISE AT THIS LEVEL?: 40 MIN

## 2022-08-11 SDOH — ECONOMIC STABILITY: TRANSPORTATION INSECURITY
IN THE PAST 12 MONTHS, HAS LACK OF TRANSPORTATION KEPT YOU FROM MEETINGS, WORK, OR FROM GETTING THINGS NEEDED FOR DAILY LIVING?: NO

## 2022-08-11 SDOH — ECONOMIC STABILITY: INCOME INSECURITY: HOW HARD IS IT FOR YOU TO PAY FOR THE VERY BASICS LIKE FOOD, HOUSING, MEDICAL CARE, AND HEATING?: NOT VERY HARD

## 2022-08-11 SDOH — ECONOMIC STABILITY: FOOD INSECURITY: WITHIN THE PAST 12 MONTHS, YOU WORRIED THAT YOUR FOOD WOULD RUN OUT BEFORE YOU GOT MONEY TO BUY MORE.: NEVER TRUE

## 2022-08-11 SDOH — ECONOMIC STABILITY: TRANSPORTATION INSECURITY
IN THE PAST 12 MONTHS, HAS THE LACK OF TRANSPORTATION KEPT YOU FROM MEDICAL APPOINTMENTS OR FROM GETTING MEDICATIONS?: NO

## 2022-08-11 ASSESSMENT — ENCOUNTER SYMPTOMS
COUGH: 0
NERVOUS/ANXIOUS: 0
EYE PAIN: 0
ARTHRALGIAS: 0
NAUSEA: 0
JOINT SWELLING: 0
DYSURIA: 0
PALPITATIONS: 0
MYALGIAS: 0
SORE THROAT: 0
HEADACHES: 0
HEMATURIA: 0
DIZZINESS: 0
DIARRHEA: 0
CONSTIPATION: 0
FEVER: 0
SHORTNESS OF BREATH: 0
FREQUENCY: 0
HEARTBURN: 0
WEAKNESS: 0
BREAST MASS: 0
ABDOMINAL PAIN: 0
CHILLS: 0
PARESTHESIAS: 0
HEMATOCHEZIA: 0

## 2022-08-11 ASSESSMENT — SOCIAL DETERMINANTS OF HEALTH (SDOH)
HOW OFTEN DO YOU ATTEND CHURCH OR RELIGIOUS SERVICES?: NEVER
HOW OFTEN DO YOU GET TOGETHER WITH FRIENDS OR RELATIVES?: ONCE A WEEK
IN A TYPICAL WEEK, HOW MANY TIMES DO YOU TALK ON THE PHONE WITH FAMILY, FRIENDS, OR NEIGHBORS?: ONCE A WEEK
DO YOU BELONG TO ANY CLUBS OR ORGANIZATIONS SUCH AS CHURCH GROUPS UNIONS, FRATERNAL OR ATHLETIC GROUPS, OR SCHOOL GROUPS?: YES

## 2022-08-11 ASSESSMENT — LIFESTYLE VARIABLES
SKIP TO QUESTIONS 9-10: 1
AUDIT-C TOTAL SCORE: 2
HOW MANY STANDARD DRINKS CONTAINING ALCOHOL DO YOU HAVE ON A TYPICAL DAY: 1 OR 2
HOW OFTEN DO YOU HAVE A DRINK CONTAINING ALCOHOL: 2-4 TIMES A MONTH
HOW OFTEN DO YOU HAVE SIX OR MORE DRINKS ON ONE OCCASION: NEVER

## 2022-08-12 ENCOUNTER — OFFICE VISIT (OUTPATIENT)
Dept: FAMILY MEDICINE | Facility: CLINIC | Age: 57
End: 2022-08-12
Payer: COMMERCIAL

## 2022-08-12 VITALS
DIASTOLIC BLOOD PRESSURE: 88 MMHG | HEIGHT: 67 IN | TEMPERATURE: 97.9 F | SYSTOLIC BLOOD PRESSURE: 124 MMHG | HEART RATE: 89 BPM | WEIGHT: 223.4 LBS | OXYGEN SATURATION: 99 % | BODY MASS INDEX: 35.06 KG/M2 | RESPIRATION RATE: 16 BRPM

## 2022-08-12 DIAGNOSIS — Z23 COVID-19 VACCINE ADMINISTERED: ICD-10-CM

## 2022-08-12 DIAGNOSIS — E03.9 ACQUIRED HYPOTHYROIDISM: ICD-10-CM

## 2022-08-12 DIAGNOSIS — Z90.710 S/P HYSTERECTOMY WITH OOPHORECTOMY: ICD-10-CM

## 2022-08-12 DIAGNOSIS — Z00.00 ROUTINE GENERAL MEDICAL EXAMINATION AT A HEALTH CARE FACILITY: Primary | ICD-10-CM

## 2022-08-12 DIAGNOSIS — Z90.721 S/P HYSTERECTOMY WITH OOPHORECTOMY: ICD-10-CM

## 2022-08-12 DIAGNOSIS — Z12.83 SKIN CANCER SCREENING: ICD-10-CM

## 2022-08-12 DIAGNOSIS — Z12.11 SCREEN FOR COLON CANCER: ICD-10-CM

## 2022-08-12 LAB
CHOLEST SERPL-MCNC: 242 MG/DL
FASTING STATUS PATIENT QL REPORTED: ABNORMAL
HBA1C MFR BLD: 5.6 % (ref 0–5.6)
HDLC SERPL-MCNC: 62 MG/DL
LDLC SERPL CALC-MCNC: 144 MG/DL
NONHDLC SERPL-MCNC: 180 MG/DL
T4 FREE SERPL-MCNC: 1 NG/DL (ref 0.76–1.46)
TRIGL SERPL-MCNC: 180 MG/DL
TSH SERPL DL<=0.005 MIU/L-ACNC: 3.94 MU/L (ref 0.4–4)

## 2022-08-12 PROCEDURE — 91305 COVID-19,PF,PFIZER (12+ YRS): CPT | Performed by: FAMILY MEDICINE

## 2022-08-12 PROCEDURE — 80061 LIPID PANEL: CPT | Performed by: FAMILY MEDICINE

## 2022-08-12 PROCEDURE — 99213 OFFICE O/P EST LOW 20 MIN: CPT | Mod: 25 | Performed by: FAMILY MEDICINE

## 2022-08-12 PROCEDURE — 99396 PREV VISIT EST AGE 40-64: CPT | Mod: 25 | Performed by: FAMILY MEDICINE

## 2022-08-12 PROCEDURE — 36415 COLL VENOUS BLD VENIPUNCTURE: CPT | Performed by: FAMILY MEDICINE

## 2022-08-12 PROCEDURE — 84443 ASSAY THYROID STIM HORMONE: CPT | Performed by: FAMILY MEDICINE

## 2022-08-12 PROCEDURE — 83036 HEMOGLOBIN GLYCOSYLATED A1C: CPT | Performed by: FAMILY MEDICINE

## 2022-08-12 PROCEDURE — 0054A COVID-19,PF,PFIZER (12+ YRS): CPT | Performed by: FAMILY MEDICINE

## 2022-08-12 PROCEDURE — 84439 ASSAY OF FREE THYROXINE: CPT | Performed by: FAMILY MEDICINE

## 2022-08-12 RX ORDER — ESTRADIOL 0.5 MG/1
0.5 TABLET ORAL DAILY
Qty: 90 TABLET | Refills: 3 | Status: SHIPPED | OUTPATIENT
Start: 2022-08-12 | End: 2023-10-22

## 2022-08-12 RX ORDER — LEVOTHYROXINE SODIUM 75 UG/1
75 TABLET ORAL DAILY
Qty: 90 TABLET | Refills: 3 | Status: SHIPPED | OUTPATIENT
Start: 2022-08-12 | End: 2023-09-24

## 2022-08-12 ASSESSMENT — ENCOUNTER SYMPTOMS
SORE THROAT: 0
PARESTHESIAS: 0
FREQUENCY: 0
CONSTIPATION: 0
SHORTNESS OF BREATH: 0
ARTHRALGIAS: 0
CHILLS: 0
WEAKNESS: 0
NERVOUS/ANXIOUS: 0
MYALGIAS: 0
DIARRHEA: 0
DIZZINESS: 0
NAUSEA: 0
JOINT SWELLING: 0
HEMATURIA: 0
ABDOMINAL PAIN: 0
PALPITATIONS: 0
DYSURIA: 0
EYE PAIN: 0
HEMATOCHEZIA: 0
BREAST MASS: 0
COUGH: 0
HEARTBURN: 0
HEADACHES: 0
FEVER: 0

## 2022-08-12 NOTE — PROGRESS NOTES
SUBJECTIVE:   CC: Gilma Hargrove is an 56 year old woman who presents for preventive health visit.     Patient has been advised of split billing requirements and indicates understanding: Yes     Pt is wanting more information about a general skin screen to get a baseline.     Healthy Habits:     Getting at least 3 servings of Calcium per day:  Yes    Bi-annual eye exam:  Yes    Dental care twice a year:  Yes    Sleep apnea or symptoms of sleep apnea:  None    Diet:  Low salt    Frequency of exercise:  2-3 days/week    Duration of exercise:  15-30 minutes    Taking medications regularly:  Yes    Medication side effects:  Not applicable    PHQ-2 Total Score: 1    Additional concerns today:  Yes      Today's PHQ-2 Score:   PHQ-2 (  [employer]) 2022   Q1: Little interest or pleasure in doing things 1   Q2: Feeling down, depressed or hopeless 0   PHQ-2 Score 1   PHQ-2 Total Score (12-17 Years)- Positive if 3 or more points; Administer PHQ-A if positive -   Q1: Little interest or pleasure in doing things Not at all   Q2: Feeling down, depressed or hopeless Not at all   PHQ-2 Score 0     Abuse: Current or Past (Physical, Sexual or Emotional) - No  Do you feel safe in your environment? Yes    Have you ever done Advance Care Planning? (For example, a Health Directive, POLST, or a discussion with a medical provider or your loved ones about your wishes): No, advance care planning information given to patient to review.  Patient plans to discuss their wishes with loved ones or provider.      Social History     Tobacco Use     Smoking status: Former Smoker     Packs/day: 0.00     Years: 10.00     Pack years: 0.00     Types: Cigarettes     Start date: 1981     Quit date: 1987     Years since quittin.6     Smokeless tobacco: Never Used     Tobacco comment: randomly social smoked on & off in 20s & 30s, sometimes 3-4 mths without   Substance Use Topics     Alcohol use: Yes     Comment: occ       Alcohol Use  2022   Prescreen: >3 drinks/day or >7 drinks/week? No       Reviewed orders with patient.  Reviewed health maintenance and updated orders accordingly - Yes  Patient Active Problem List   Diagnosis     Acquired hypothyroidism     S/P hysterectomy with oophorectomy     Arthritis of right hip     Hormone replacement therapy     Family history of rheumatoid arthritis     Past Surgical History:   Procedure Laterality Date     HYSTERECTOMY, PAP NO LONGER INDICATED       HYSTERECTOMY, PAP NO LONGER INDICATED       ORTHOPEDIC SURGERY      broken fibula, torn ankle ligaments (left)       Social History     Tobacco Use     Smoking status: Former Smoker     Packs/day: 0.00     Years: 10.00     Pack years: 0.00     Types: Cigarettes     Start date: 1981     Quit date: 1987     Years since quittin.6     Smokeless tobacco: Never Used     Tobacco comment: randomly social smoked on & off in 20s & 30s, sometimes 3-4 mths without   Substance Use Topics     Alcohol use: Yes     Comment: occ     Family History   Problem Relation Age of Onset     Thyroid Cancer Mother      Coronary Artery Disease Mother         heart attack in her early 80s     Hypertension Mother         not until her late 70s     Hyperlipidemia Mother         much of her adult life (when tested)     Other Cancer Mother         thyroid     Anxiety Disorder Mother         in her 70s & 80s     Thyroid Disease Mother         had partial removal due to cancer     Other Cancer Father         pancreatic     Arthritis Father      Hypertension Father         much of adult life     Depression Father         most of his life     Anxiety Disorder Father         most of his life     Asthma Father      Obesity Father         at various times as adult     Breast Cancer Paternal Grandmother         Had breast removal in her 60s (1970s)     Genetic Disorder Sister         developed MS in her 50s     Genetic Disorder Sister         developed ME in her 40s      Ovarian Cancer No family hx of            Breast Cancer Screening:    FHS-7:   Breast CA Risk Assessment (FHS-7) 2/4/2022 2/4/2022 8/11/2022   Did any of your first-degree relatives have breast or ovarian cancer? No No No   Did any of your relatives have bilateral breast cancer? No No No   Did any man in your family have breast cancer? No No No   Did any woman in your family have breast and ovarian cancer? No No No   Did any woman in your family have breast cancer before age 50 y? No No No   Do you have 2 or more relatives with breast and/or ovarian cancer? No No No   Do you have 2 or more relatives with breast and/or bowel cancer? No No No       Mammogram Screening: Recommended mammography every 1-2 years with patient discussion and risk factor consideration  Pertinent mammograms are reviewed under the imaging tab.    History of abnormal Pap smear: Status post benign hysterectomy. Health Maintenance and Surgical History updated.     Reviewed and updated as needed this visit by clinical staff   Tobacco  Allergies  Meds   Med Hx  Surg Hx  Fam Hx  Soc Hx          Reviewed and updated as needed this visit by Provider                       Review of Systems   Constitutional: Negative for chills and fever.   HENT: Negative for congestion, ear pain, hearing loss and sore throat.    Eyes: Negative for pain and visual disturbance.   Respiratory: Negative for cough and shortness of breath.    Cardiovascular: Negative for chest pain, palpitations and peripheral edema.   Gastrointestinal: Negative for abdominal pain, constipation, diarrhea, heartburn, hematochezia and nausea.   Breasts:  Negative for tenderness, breast mass and discharge.   Genitourinary: Negative for dysuria, frequency, genital sores, hematuria, pelvic pain, urgency, vaginal bleeding and vaginal discharge.   Musculoskeletal: Negative for arthralgias, joint swelling and myalgias.   Skin: Negative for rash.   Neurological: Negative for dizziness,  "weakness, headaches and paresthesias.   Psychiatric/Behavioral: Negative for mood changes. The patient is not nervous/anxious.         OBJECTIVE:   /88   Pulse 89   Temp 97.9  F (36.6  C) (Oral)   Resp 16   Ht 1.708 m (5' 7.25\")   Wt 101.3 kg (223 lb 6.4 oz)   SpO2 99%   BMI 34.73 kg/m    Physical Exam  GENERAL APPEARANCE: healthy, alert and no distress  EYES: Eyes grossly normal to inspection, PERRL and conjunctivae and sclerae normal  HENT: ear canals and TM's normal, nose and mouth without ulcers or lesions, oropharynx clear and oral mucous membranes moist  NECK: no adenopathy, no asymmetry, masses, or scars and thyroid normal to palpation  RESP: lungs clear to auscultation - no rales, rhonchi or wheezes  BREAST: normal without masses, tenderness or nipple discharge and no palpable axillary masses or adenopathy  CV: regular rate and rhythm, normal S1 S2, no S3 or S4, no murmur, click or rub, no peripheral edema and peripheral pulses strong  ABDOMEN: soft, nontender, no hepatosplenomegaly, no masses and bowel sounds normal  MS: no musculoskeletal defects are noted and gait is age appropriate without ataxia  SKIN: no suspicious lesions or rashes  NEURO: Normal strength and tone, sensory exam grossly normal, mentation intact and speech normal  PSYCH: mentation appears normal and affect normal/bright    Diagnostic Test Results:  Labs reviewed in Epic    ASSESSMENT/PLAN:     1. Routine general medical examination at a health care facility  - Hemoglobin A1c; Future  - Lipid panel reflex to direct LDL Fasting; Future  - Hemoglobin A1c  - Lipid panel reflex to direct LDL Fasting    3. Screen for colon cancer - has cologuard at home, will complete    4. Acquired hypothyroidism - surveillance labs today, refills  - T4, free; Future  - TSH; Future  - levothyroxine (SYNTHROID/LEVOTHROID) 75 MCG tablet; Take 1 tablet (75 mcg) by mouth daily  Dispense: 90 tablet; Refill: 3  - T4, free  - TSH    5. S/P hysterectomy " "with oophorectomy - stable, getting annual mammogram, refills  - estradiol (ESTRACE) 0.5 MG tablet; Take 1 tablet (0.5 mg) by mouth daily  Dispense: 90 tablet; Refill: 3    6. COVID-19 vaccine administered  - COVID-19,BUCK,PFIZER (12+ Yrs GRAY LABEL)      COUNSELING:  Reviewed preventive health counseling, as reflected in patient instructions    Estimated body mass index is 34.73 kg/m  as calculated from the following:    Height as of this encounter: 1.708 m (5' 7.25\").    Weight as of this encounter: 101.3 kg (223 lb 6.4 oz).      She reports that she quit smoking about 35 years ago. Her smoking use included cigarettes. She started smoking about 41 years ago. She smoked 0.00 packs per day for 10.00 years. She has never used smokeless tobacco.      Chyna Blunt MD  [facility]  "

## 2022-10-29 ENCOUNTER — HEALTH MAINTENANCE LETTER (OUTPATIENT)
Age: 57
End: 2022-10-29

## 2022-12-09 ENCOUNTER — TELEPHONE (OUTPATIENT)
Dept: ALLERGY | Facility: CLINIC | Age: 57
End: 2022-12-09

## 2022-12-09 NOTE — TELEPHONE ENCOUNTER
Patient phoned to ask questions about nasal spray prior to appointment on 12/16/22. Instructed patient to continue taking Flonase and Mucinex as needed.     Javier EAST RN, BSN

## 2022-12-16 ENCOUNTER — OFFICE VISIT (OUTPATIENT)
Dept: ALLERGY | Facility: CLINIC | Age: 57
End: 2022-12-16
Payer: COMMERCIAL

## 2022-12-16 VITALS
HEART RATE: 100 BPM | SYSTOLIC BLOOD PRESSURE: 133 MMHG | OXYGEN SATURATION: 99 % | DIASTOLIC BLOOD PRESSURE: 92 MMHG | WEIGHT: 217.6 LBS | BODY MASS INDEX: 33.83 KG/M2

## 2022-12-16 DIAGNOSIS — J31.0 CHRONIC RHINITIS: ICD-10-CM

## 2022-12-16 DIAGNOSIS — J45.990 EXERCISE-INDUCED ASTHMA: ICD-10-CM

## 2022-12-16 DIAGNOSIS — T78.1XXA ADVERSE REACTION TO FOOD, INITIAL ENCOUNTER: Primary | ICD-10-CM

## 2022-12-16 PROCEDURE — 95004 PERQ TESTS W/ALRGNC XTRCS: CPT | Performed by: INTERNAL MEDICINE

## 2022-12-16 PROCEDURE — 99204 OFFICE O/P NEW MOD 45 MIN: CPT | Mod: 25 | Performed by: INTERNAL MEDICINE

## 2022-12-16 RX ORDER — ALBUTEROL SULFATE 90 UG/1
2 AEROSOL, METERED RESPIRATORY (INHALATION) EVERY 6 HOURS PRN
Qty: 18 G | Refills: 0 | Status: SHIPPED | OUTPATIENT
Start: 2022-12-16 | End: 2023-10-27

## 2022-12-16 RX ORDER — AZELASTINE 1 MG/ML
1 SPRAY, METERED NASAL 2 TIMES DAILY
Qty: 30 ML | Refills: 3 | Status: SHIPPED | OUTPATIENT
Start: 2022-12-16 | End: 2023-06-30

## 2022-12-16 ASSESSMENT — ENCOUNTER SYMPTOMS
FACIAL SWELLING: 0
CHEST TIGHTNESS: 0
JOINT SWELLING: 0
EYE REDNESS: 0
NAUSEA: 0
VOMITING: 0
HEADACHES: 0
COUGH: 0
ADENOPATHY: 0
FEVER: 0
CHILLS: 0
WHEEZING: 0
MYALGIAS: 0
EYE ITCHING: 0
ACTIVITY CHANGE: 0
ARTHRALGIAS: 0
SHORTNESS OF BREATH: 0
DIARRHEA: 0
EYE DISCHARGE: 0

## 2022-12-16 NOTE — PATIENT INSTRUCTIONS
We will check IgE levels to numerous foods including peppers, shellfish, tomatoes and pork.  Will prescribe albuterol for your history of exercise-induced asthma.  No evidence of allergies based on negative skin testing.  Continue with the Flonase and Zyrtec.  Minimize the use of the Vicks Sinex  Will prescribe azelastine 1 to 2 sprays each nostril twice daily which can be combined with the Flonase which hopefully will help minimize the use of the nasal decongestant which is likely contributing to rhinitis medicamentosa    Allergy Staff Appt Hours Shot Hours Location         Physician   Maksim Alexander MD      Support Staff   Anna TRAN, ALMAS EAST, RN   Diego CUNNINGHAM, MA   Aaron NYE, ESTELLE          Mondays  Not available until January/2023 Wednesdays  Close    Tuesdays Thursdays and Fridays:  [place] 7-5                    [place]     Tuesdays: 7:40-3:20      Fridays: 7:40-4:20                [facility]  [address]  [place] [address]  Appt Line: [phone]    Pulmonary Function Scheduling:  [place]: [phone]         Questions about cost of your care?  For questions about your cost of your visit, procedure, lab or imaging contact: [facility] Consumer Price Line [phone] or visit: [email or web address]

## 2022-12-16 NOTE — PROGRESS NOTES
Gilma Hargrove was seen in the Allergy Clinic at [facility].    Gilma Hargrove is a 57 year old female being seen today in consultation for seasonal allergies.  Also has food allergy concerns as well as a concern for exercise-induced asthma and chronic rhinitis.    She describes how she has been to multiple different allergists in [place], [place] and [place].  The last time she had skin testing was over 10 years ago.  She has recent ENT provider who wanted her tested for allergies to see if that is contributing to her sinus disease.  She does use some Vicks Sinex daily which is a nasal decongestant.  Also uses Flonase and Mucinex and Zyrtec.  She has occasional sinus infections.    She also has a history of exercise-induced asthma and it sounds like she has done exercise-induced spirometry's in the past.  She is wondering if she had an up-to-date albuterol inhaler.  Her albuterol is .    She describes how she has had problems with paprika, cayenne pepper, chili pepper, Bell peppers, shellfish, tomatoes and pork in the past.  She describes that with the peppers that she has had a throat closing sensation.  She does not have an EpiPen.  She took Benadryl 25 to 30 years ago and has not had a subsequent reaction.  It was difficult to qualify how severe the reactions were in the past.  She describes how the allergist told her the levels were low and likely would not cause problems for her.  She is still avoiding these foods.  She would like to have further evaluation.        Patient supplied answers from flow sheet for:  Allergy and Asthma Intake Questionnaire.  Allergy and Asthma Questionnaire Social History  Family Doctor: : (P) Dr Chyna Castelan  Did He or She suggest you see us?: (P) Yes  Clinic:: (P) [facility]  Check the reasons for your appointment: (P) Nasal or Sinus Symptoms, Food Allergy  Tell us about your home: (P) House, Cats, Dogs, Animals that sleep in bedroom, In  suburb, Forced air heat, Air conditioning, Room air   Job:: (P)   Leisure Activities (hobbies):: (P) reading, hiking, travel    Allergy Testing  Have you been Tested for Allergies? : (P) Yes  If yes, when?: (P) can t remember, somewhere in the early 2000s  At what clinic?: (P) [facility] (not 100% sure)    Nasal and Eye Symptoms   Check any nasal or sinus symptoms you have: : (P) Stuffy head or nose, Drainage down throat, Ear Pressure, Sinus headaches  Have you had Nasal polyps?: (P) No  Have you had sinus surgery?: (P) No  Have you had sinus infections?: (P) Yes  How many in the past year?: (P) 2  Have you used any nasal sprays?: (P) Yes  Names:: (P) Vicks sinex moisturizing  Have you used any pills for symptoms?: (P) Yes  Names:: (P) Mucinex max strength, ibuprophen, phenylephrine, pseudophedrine  Have you used any eye drops: (P) Yes  Names:: (P) Ultra sensitive  When do nasal, sinus or eye symptoms occur?: (P) All the time  What makes nasal, sinus or eye symptoms worse?: (P) Temperature changes, Heat, Humidity, Cigarette smoke, Scents, perfumes, Colds, infections  Foods:: (P) not sure if foods are affecting sinus, but do have other symptoms from food allergies  Drugs: : (P) don t think drugs are causing symptoms     Do you eat any of the foods listed below?  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, hereditary     Thyroid disease 2017    onset at same time as pre-menopause     Uncomplicated asthma 2003    exertional asthma, became more noticeable as I aged     Family History   Problem Relation Age of Onset     Thyroid Cancer Mother      Coronary Artery Disease Mother         heart attack in her early 80s     Hypertension Mother         not until her late 70s     Hyperlipidemia Mother         much of her  adult life (when tested)     Other Cancer Mother         thyroid     Anxiety Disorder Mother         in her 70s & 80s     Thyroid Disease Mother         had partial removal due to cancer     Other Cancer Father         pancreatic     Arthritis Father      Hypertension Father         much of adult life     Depression Father         most of his life     Anxiety Disorder Father         most of his life     Asthma Father      Obesity Father         at various times as adult     Breast Cancer Paternal Grandmother         Had breast removal in her 60s (1970s)     Genetic Disorder Sister         developed MS in her 50s     Genetic Disorder Sister         developed ME in her 40s     Ovarian Cancer No family hx of      Past Surgical History:   Procedure Laterality Date     HYSTERECTOMY, PAP NO LONGER INDICATED       HYSTERECTOMY, PAP NO LONGER INDICATED       ORTHOPEDIC SURGERY  1982    broken fibula, torn ankle ligaments (left)       ENVIRONMENTAL HISTORY:   Pets inside the house include 3 cat(s) and 3 dog(s).  Do you smoke cigarettes or other recreational drugs? No There is/are 0 smokers living in the house. The house does not have a damp basement.     SOCIAL HISTORY:   Gilma is employed as . She lives with her sisters.      Review of Systems   Constitutional: Negative for activity change, chills and fever.   HENT: Positive for postnasal drip. Negative for congestion, dental problem, ear pain, facial swelling, nosebleeds and sneezing.    Eyes: Negative for discharge, redness and itching.   Respiratory: Negative for cough, chest tightness, shortness of breath and wheezing.    Cardiovascular: Negative for chest pain.   Gastrointestinal: Negative for diarrhea, nausea and vomiting.   Musculoskeletal: Negative for arthralgias, joint swelling and myalgias.   Skin: Negative for rash.   Neurological: Negative for headaches.   Hematological: Negative for adenopathy.   Psychiatric/Behavioral: Negative for  behavioral problems and self-injury.         Current Outpatient Medications:      albuterol (PROAIR HFA/PROVENTIL HFA/VENTOLIN HFA) 108 (90 Base) MCG/ACT inhaler, Inhale 2 puffs into the lungs every 6 hours as needed for shortness of breath, wheezing or cough, Disp: 18 g, Rfl: 0     azelastine (ASTELIN) 0.1 % nasal spray, Spray 1 spray into both nostrils 2 times daily, Disp: 30 mL, Rfl: 3     estradiol (ESTRACE) 0.5 MG tablet, Take 1 tablet (0.5 mg) by mouth daily, Disp: 90 tablet, Rfl: 3     FLUTICASONE PROPIONATE, NASAL, NA, Spray 50 mcg in nostril daily as needed, Disp: , Rfl:      levothyroxine (SYNTHROID/LEVOTHROID) 75 MCG tablet, Take 1 tablet (75 mcg) by mouth daily, Disp: 90 tablet, Rfl: 3     cetirizine (ZYRTEC) 10 MG tablet, Take 10 mg by mouth daily, Disp: , Rfl:   Allergies   Allergen Reactions     Codeine      Sulfa Drugs          EXAM:   BP (!) 133/92   Pulse 100   Wt 98.7 kg (217 lb 9.6 oz)   SpO2 99%   BMI 33.83 kg/m      Physical Exam    Constitutional:       General: She is not in acute distress.     Appearance: Normal appearance. She is not ill-appearing.   HENT:      Head: Normocephalic and atraumatic.      Nose: Nose normal. No congestion or rhinorrhea.      Mouth/Throat:      Mouth: Mucous membranes are moist.      Pharynx: Oropharynx is clear. No posterior oropharyngeal erythema.   Eyes:      General:         Right eye: No discharge.         Left eye: No discharge.   Cardiovascular:      Rate and Rhythm: Normal rate and regular rhythm.      Heart sounds: Normal heart sounds.   Pulmonary:      Effort: Pulmonary effort is normal.      Breath sounds: Normal breath sounds. No wheezing or rhonchi.   Skin:     General: Skin is warm.      Findings: No erythema or rash.   Neurological:      General: No focal deficit present.      Mental Status: She is alert. Mental status is at baseline.   Psychiatric:         Mood and Affect: Mood normal.         Behavior: Behavior normal.     WORKUP: Skin  testing was negative to allergens.    ENVIRONMENTAL PERCUTANEOUS SKIN TESTING: ADULT  [facility] 12/16/2022   Consent Y   Ordering Physician Benjamin   Interpreting Physician Benjamin   Testing Technician Javier EAST RN   Location Back   Time start:  4:20 PM   Time End:  4:35 PM   Positive Control: Histatrol*ALK 1 mg/ml 5/10   Negative Control: 50% Glycerin 0   Cat Hair*ALK (10,000 BAU/ml) 0   AP Dog Hair/Dander (1:100 w/v) 0   Dust Mite p. 30,000 AU/ml 0   Dust Mite f. (30,000 AU/ml) 0   Jon (W/F in millimeters) 0   Janak Grass (100,000 BAU/mL) 0   Red Cedar (W/F in millimeters) 0   Maple/[place] (W/F in millimeters) 0   Hackberry (W/F in millimeters) 0   [place] (W/F in millimeters) 0   [place] *ALK (W/F in millimeters) 0   American Elm (W/F in millimeters) 0   [place] (W/F in millimeters) 0   Black [place] (W/F in millimeters) 0   Birch Mix (W/F in millimeters) 0   [place] (W/F in millimeters) 0   Oak (W/F in millimeters) 0   [place] (W/F in millimeters) 0   [place] (W/F in millimeters) 0   [place] (W/F in millimeters) 0   Careless (W/F in millimeters) 0   Nettle (W/F in millimeters) 0   English Plantain (W/F in millimeters) 0   Kochia (W/F in millimeters) 0   Lamb's Quarter (W/F in millimeters) 0   Marshelder (W/F in millimeters) 0   Russian Thistle (W/F in millimeters) 0   Sagebrush/Mugwort (W/F in millimeters) 0   Sheep Sorrel (W/F in millimeters) 0   Feather Mix* ALK (W/F in millimeters) 0   Penicillium Mix (1:10 w/v) 0   Curvularia spicifera (1:10 w/v) 0   Epicoccum (1:10 w/v) 0   Aspergillus fumigatus (1:10 w/v): 0   Alternaria tenius (1:10 w/v) 0   H. Cladosporium (1:10 w/v) 0   Phoma herbarum (1:10 w/v) 0      Skin testing consent was obtained  ASSESSMENT/PLAN:  Gilma Hargrove is a 57 year old female seen today for asthma concerns, food allergy concerns, and chronic rhinitis with possible sinusitis concerns.  Prescribed albuterol due to her exercise-induced asthma history.  Recommend to  decrease the use or stop the use of the nasal decongestant.    1. We will check IgE levels to numerous foods including peppers, shellfish, tomatoes and pork.  2. Will prescribe albuterol for your history of exercise-induced asthma.  3. No evidence of allergies based on negative skin testing.  Continue with the Flonase and Zyrtec.  Minimize the use of the Vicks Sinex  4. Will prescribe azelastine 1 to 2 sprays each nostril twice daily which can be combined with the Flonase which hopefully will help minimize the use of the nasal decongestant which is likely contributing to rhinitis medicamentosa    Follow-up as needed      Thank you for allowing me to participate in the care of Gilma VINNY Pathakgeorgina.      I spent 35 minutes on the date of the encounter doing chart review, history and exam, documentation and further coordination as noted above exclusive of separately reported interpretations    Maksim Alexander MD  Allergy/Immunology  [facility]

## 2022-12-16 NOTE — LETTER
2022         RE: Gilma Hargrove  [address] [place] [address]        Dear Colleague,    Thank you for referring your patient, Gilma Hargrove, to the [facility] [place]. Please see a copy of my visit note below.    Gilma Hargrove was seen in the Allergy Clinic at [facility].    Gilma Hargrove is a 57 year old female being seen today in consultation for seasonal allergies.  Also has food allergy concerns as well as a concern for exercise-induced asthma and chronic rhinitis.    She describes how she has been to multiple different allergists in [place], [place] and [place].  The last time she had skin testing was over 10 years ago.  She has recent ENT provider who wanted her tested for allergies to see if that is contributing to her sinus disease.  She does use some Vicks Sinex daily which is a nasal decongestant.  Also uses Flonase and Mucinex and Zyrtec.  She has occasional sinus infections.    She also has a history of exercise-induced asthma and it sounds like she has done exercise-induced spirometry's in the past.  She is wondering if she had an up-to-date albuterol inhaler.  Her albuterol is .    She describes how she has had problems with paprika, cayenne pepper, chili pepper, Bell peppers, shellfish, tomatoes and pork in the past.  She describes that with the peppers that she has had a throat closing sensation.  She does not have an EpiPen.  She took Benadryl 25 to 30 years ago and has not had a subsequent reaction.  It was difficult to qualify how severe the reactions were in the past.  She describes how the allergist told her the levels were low and likely would not cause problems for her.  She is still avoiding these foods.  She would like to have further evaluation.        Patient supplied answers from flow sheet for:  Allergy and Asthma Intake Questionnaire.  Allergy and Asthma Questionnaire Social History  Family Doctor: : (P)   Chyna Castelan  Did He or She suggest you see us?: (P) Yes  Clinic:: (P) [facility]  Check the reasons for your appointment: (P) Nasal or Sinus Symptoms, Food Allergy  Tell us about your home: (P) House, Cats, Dogs, Animals that sleep in bedroom, [place], Forced air heat, Air conditioning, Room air   Job:: (P)   Leisure Activities (hobbies):: (P) reading, hiking, travel    Allergy Testing  Have you been Tested for Allergies? : (P) Yes  If yes, when?: (P) can t remember, somewhere in the early 2000s  At what clinic?: (P) [facility] (not 100% sure)    Nasal and Eye Symptoms   Check any nasal or sinus symptoms you have: : (P) Stuffy head or nose, Drainage down throat, Ear Pressure, Sinus headaches  Have you had Nasal polyps?: (P) No  Have you had sinus surgery?: (P) No  Have you had sinus infections?: (P) Yes  How many in the past year?: (P) 2  Have you used any nasal sprays?: (P) Yes  Names:: (P) Vicks sinex moisturizing  Have you used any pills for symptoms?: (P) Yes  Names:: (P) Mucinex max strength, ibuprophen, phenylephrine, pseudophedrine  Have you used any eye drops: (P) Yes  Names:: (P) Ultra sensitive  When do nasal, sinus or eye symptoms occur?: (P) All the time  What makes nasal, sinus or eye symptoms worse?: (P) Temperature changes, Heat, Humidity, Cigarette smoke, Scents, perfumes, Colds, infections  Foods:: (P) not sure if foods are affecting sinus, but do have other symptoms from food allergies  Drugs: : (P) don t think drugs are causing symptoms     Do you eat any of the foods listed below?  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, hereditary     Thyroid disease 2017    onset at same time as pre-menopause     Uncomplicated asthma 2003    exertional asthma, became more noticeable as I  aged     Family History   Problem Relation Age of Onset     Thyroid Cancer Mother      Coronary Artery Disease Mother         heart attack in her early 80s     Hypertension Mother         not until her late 70s     Hyperlipidemia Mother         much of her adult life (when tested)     Other Cancer Mother         thyroid     Anxiety Disorder Mother         in her 70s & 80s     Thyroid Disease Mother         had partial removal due to cancer     Other Cancer Father         pancreatic     Arthritis Father      Hypertension Father         much of adult life     Depression Father         most of his life     Anxiety Disorder Father         most of his life     Asthma Father      Obesity Father         at various times as adult     Breast Cancer Paternal Grandmother         Had breast removal in her 60s (1970s)     Genetic Disorder Sister         developed MS in her 50s     Genetic Disorder Sister         developed ME in her 40s     Ovarian Cancer No family hx of      Past Surgical History:   Procedure Laterality Date     HYSTERECTOMY, PAP NO LONGER INDICATED       HYSTERECTOMY, PAP NO LONGER INDICATED       ORTHOPEDIC SURGERY  1982    broken fibula, torn ankle ligaments (left)       ENVIRONMENTAL HISTORY:   Pets inside the house include 3 cat(s) and 3 dog(s).  Do you smoke cigarettes or other recreational drugs? No There is/are 0 smokers living in the house. The house does not have a damp basement.     SOCIAL HISTORY:   Gilma is employed as . She lives with her sisters.      Review of Systems   Constitutional: Negative for activity change, chills and fever.   HENT: Positive for postnasal drip. Negative for congestion, dental problem, ear pain, facial swelling, nosebleeds and sneezing.    Eyes: Negative for discharge, redness and itching.   Respiratory: Negative for cough, chest tightness, shortness of breath and wheezing.    Cardiovascular: Negative for chest pain.   Gastrointestinal: Negative for  diarrhea, nausea and vomiting.   Musculoskeletal: Negative for arthralgias, joint swelling and myalgias.   Skin: Negative for rash.   Neurological: Negative for headaches.   Hematological: Negative for adenopathy.   Psychiatric/Behavioral: Negative for behavioral problems and self-injury.         Current Outpatient Medications:      albuterol (PROAIR HFA/PROVENTIL HFA/VENTOLIN HFA) 108 (90 Base) MCG/ACT inhaler, Inhale 2 puffs into the lungs every 6 hours as needed for shortness of breath, wheezing or cough, Disp: 18 g, Rfl: 0     azelastine (ASTELIN) 0.1 % nasal spray, Spray 1 spray into both nostrils 2 times daily, Disp: 30 mL, Rfl: 3     estradiol (ESTRACE) 0.5 MG tablet, Take 1 tablet (0.5 mg) by mouth daily, Disp: 90 tablet, Rfl: 3     FLUTICASONE PROPIONATE, NASAL, NA, Spray 50 mcg in nostril daily as needed, Disp: , Rfl:      levothyroxine (SYNTHROID/LEVOTHROID) 75 MCG tablet, Take 1 tablet (75 mcg) by mouth daily, Disp: 90 tablet, Rfl: 3     cetirizine (ZYRTEC) 10 MG tablet, Take 10 mg by mouth daily, Disp: , Rfl:   Allergies   Allergen Reactions     Codeine      Sulfa Drugs          EXAM:   BP (!) 133/92   Pulse 100   Wt 98.7 kg (217 lb 9.6 oz)   SpO2 99%   BMI 33.83 kg/m      Physical Exam    Constitutional:       General: She is not in acute distress.     Appearance: Normal appearance. She is not ill-appearing.   HENT:      Head: Normocephalic and atraumatic.      Nose: Nose normal. No congestion or rhinorrhea.      Mouth/Throat:      Mouth: Mucous membranes are moist.      Pharynx: Oropharynx is clear. No posterior oropharyngeal erythema.   Eyes:      General:         Right eye: No discharge.         Left eye: No discharge.   Cardiovascular:      Rate and Rhythm: Normal rate and regular rhythm.      Heart sounds: Normal heart sounds.   Pulmonary:      Effort: Pulmonary effort is normal.      Breath sounds: Normal breath sounds. No wheezing or rhonchi.   Skin:     General: Skin is warm.      Findings:  No erythema or rash.   Neurological:      General: No focal deficit present.      Mental Status: She is alert. Mental status is at baseline.   Psychiatric:         Mood and Affect: Mood normal.         Behavior: Behavior normal.     WORKUP: Skin testing was negative to allergens.    ENVIRONMENTAL PERCUTANEOUS SKIN TESTING: ADULT  [facility] 12/16/2022   Consent Y   Ordering Physician Benjamin   Interpreting Physician Benjamin   Testing Technician Jaiver EAST RN   Location Back   Time start:  4:20 PM   Time End:  4:35 PM   Positive Control: Histatrol*ALK 1 mg/ml 5/10   Negative Control: 50% Glycerin 0   Cat Hair*ALK (10,000 BAU/ml) 0   AP Dog Hair/Dander (1:100 w/v) 0   Dust Mite p. 30,000 AU/ml 0   Dust Mite f. (30,000 AU/ml) 0   Jon (W/F in millimeters) 0   Janak Grass (100,000 BAU/mL) 0   Red Cedar (W/F in millimeters) 0   Maple/[place] (W/F in millimeters) 0   [place]berry (W/F in millimeters) 0   [place] (W/F in millimeters) 0   [place] *ALK (W/F in millimeters) 0   American Elm (W/F in millimeters) 0   [place] (W/F in millimeters) 0   Black [place] (W/F in millimeters) 0   Birch [place] (W/F in millimeters) 0   [place] (W/F in millimeters) 0   Oak (W/F in millimeters) 0   [place] (W/F in millimeters) 0   [place] (W/F in millimeters) 0   [place] (W/F in millimeters) 0   Careless (W/F in millimeters) 0   Nettle (W/F in millimeters) 0   English Plantain (W/F in millimeters) 0   Kochia (W/F in millimeters) 0   Lamb's Quarter (W/F in millimeters) 0   Marshelder (W/F in millimeters) 0   Russian Thistle (W/F in millimeters) 0   Sagebrush/Mugwort (W/F in millimeters) 0   Sheep Sorrel (W/F in millimeters) 0   Feather Mix* ALK (W/F in millimeters) 0   Penicillium Mix (1:10 w/v) 0   Curvularia spicifera (1:10 w/v) 0   Epicoccum (1:10 w/v) 0   Aspergillus fumigatus (1:10 w/v): 0   Alternaria tenius (1:10 w/v) 0   H. Cladosporium (1:10 w/v) 0   Phoma herbarum (1:10 w/v) 0      Skin testing consent was  obtained  ASSESSMENT/PLAN:  Gilma Hargrove is a 57 year old female seen today for asthma concerns, food allergy concerns, and chronic rhinitis with possible sinusitis concerns.  Prescribed albuterol due to her exercise-induced asthma history.  Recommend to decrease the use or stop the use of the nasal decongestant.    1. We will check IgE levels to numerous foods including peppers, shellfish, tomatoes and pork.  2. Will prescribe albuterol for your history of exercise-induced asthma.  3. No evidence of allergies based on negative skin testing.  Continue with the Flonase and Zyrtec.  Minimize the use of the Vicks Sinex  4. Will prescribe azelastine 1 to 2 sprays each nostril twice daily which can be combined with the Flonase which hopefully will help minimize the use of the nasal decongestant which is likely contributing to rhinitis medicamentosa    Follow-up as needed      Thank you for allowing me to participate in the care of Gilma Hargrove.      I spent 35 minutes on the date of the encounter doing chart review, history and exam, documentation and further coordination as noted above exclusive of separately reported interpretations    Maksim Alexander MD  Allergy/Immunology  [facility]      Verbal consent was obtained prior to procedure by the provider. Per provider verbal order, placed Adult Environmental Panel scratch test. Once antigens were placed, patient was monitored for 15 minutes in clinic. Provider read test after 15 minutes.. Patient tolerated procedure well. All questions and concerns were addressed at office visit by the provider.     Javier EAST RN, BSN              Again, thank you for allowing me to participate in the care of your patient.        Sincerely,        Maksim Alexander MD

## 2022-12-16 NOTE — PROGRESS NOTES
Verbal consent was obtained prior to procedure by the provider. Per provider verbal order, placed Adult Environmental Panel scratch test. Once antigens were placed, patient was monitored for 15 minutes in clinic. Provider read test after 15 minutes.. Patient tolerated procedure well. All questions and concerns were addressed at office visit by the provider.     Javier EAST RN, BSN

## 2022-12-18 ENCOUNTER — LAB (OUTPATIENT)
Dept: LAB | Facility: CLINIC | Age: 57
End: 2022-12-18
Payer: COMMERCIAL

## 2022-12-18 DIAGNOSIS — T78.1XXA ADVERSE REACTION TO FOOD, INITIAL ENCOUNTER: ICD-10-CM

## 2022-12-18 LAB
Lab: NORMAL
PERFORMING LABORATORY: NORMAL
SPECIMEN STATUS: NORMAL
TEST NAME: NORMAL

## 2022-12-18 PROCEDURE — 86003 ALLG SPEC IGE CRUDE XTRC EA: CPT | Mod: 59

## 2022-12-18 PROCEDURE — 86003 ALLG SPEC IGE CRUDE XTRC EA: CPT | Mod: 90

## 2022-12-18 PROCEDURE — 36415 COLL VENOUS BLD VENIPUNCTURE: CPT

## 2022-12-18 PROCEDURE — 99000 SPECIMEN HANDLING OFFICE-LAB: CPT

## 2022-12-18 PROCEDURE — 86003 ALLG SPEC IGE CRUDE XTRC EA: CPT

## 2022-12-20 LAB
CLAM IGE QN: <0.1 KU(A)/L
CRAB IGE QN: <0.1 KU(A)/L
LOBSTER IGE QN: <0.1 KU(A)/L
MAYO MISC RESULT: NORMAL
MAYO MISC RESULT: NORMAL
OYSTER IGE QN: <0.1 KU(A)/L
SCALLOP IGE QN: <0.1 KU(A)/L
SHRIMP IGE QN: <0.1 KU(A)/L
TOMATO IGE QN: <0.1 KU(A)/L

## 2022-12-21 LAB
DEPRECATED MISC ALLERGEN IGE RAST QL: NORMAL
PORK IGE QN: <0.1 KU/L

## 2022-12-22 LAB — MAYO MISC RESULT: NORMAL

## 2023-03-24 ENCOUNTER — HOSPITAL ENCOUNTER (OUTPATIENT)
Dept: MAMMOGRAPHY | Facility: CLINIC | Age: 58
Discharge: HOME | End: 2023-03-24
Attending: FAMILY MEDICINE | Admitting: FAMILY MEDICINE
Payer: COMMERCIAL

## 2023-03-24 DIAGNOSIS — Z12.31 VISIT FOR SCREENING MAMMOGRAM: ICD-10-CM

## 2023-03-24 PROCEDURE — 77067 SCR MAMMO BI INCL CAD: CPT

## 2023-06-02 ENCOUNTER — OFFICE VISIT (OUTPATIENT)
Dept: URGENT CARE | Facility: URGENT CARE | Age: 58
End: 2023-06-02
Payer: COMMERCIAL

## 2023-06-02 ENCOUNTER — NURSE TRIAGE (OUTPATIENT)
Dept: NURSING | Facility: CLINIC | Age: 58
End: 2023-06-02
Payer: COMMERCIAL

## 2023-06-02 VITALS
HEART RATE: 78 BPM | TEMPERATURE: 98 F | SYSTOLIC BLOOD PRESSURE: 153 MMHG | RESPIRATION RATE: 20 BRPM | OXYGEN SATURATION: 98 % | DIASTOLIC BLOOD PRESSURE: 86 MMHG

## 2023-06-02 DIAGNOSIS — H81.10 BENIGN PAROXYSMAL POSITIONAL VERTIGO, UNSPECIFIED LATERALITY: Primary | ICD-10-CM

## 2023-06-02 LAB
ANION GAP SERPL CALCULATED.3IONS-SCNC: 9 MMOL/L (ref 3–14)
BUN SERPL-MCNC: 19 MG/DL (ref 7–30)
CALCIUM SERPL-MCNC: 10.5 MG/DL (ref 8.5–10.1)
CHLORIDE BLD-SCNC: 106 MMOL/L (ref 94–109)
CO2 SERPL-SCNC: 29 MMOL/L (ref 20–32)
CREAT SERPL-MCNC: 0.8 MG/DL (ref 0.52–1.04)
ERYTHROCYTE [DISTWIDTH] IN BLOOD BY AUTOMATED COUNT: 13.5 % (ref 10–15)
GFR SERPL CREATININE-BSD FRML MDRD: 84 ML/MIN/1.73M2
GLUCOSE BLD-MCNC: 95 MG/DL (ref 70–99)
HCT VFR BLD AUTO: 41.5 % (ref 35–47)
HGB BLD-MCNC: 13.3 G/DL (ref 11.7–15.7)
MCH RBC QN AUTO: 27.8 PG (ref 26.5–33)
MCHC RBC AUTO-ENTMCNC: 32 G/DL (ref 31.5–36.5)
MCV RBC AUTO: 87 FL (ref 78–100)
PLATELET # BLD AUTO: 377 10E3/UL (ref 150–450)
POTASSIUM BLD-SCNC: 4.6 MMOL/L (ref 3.4–5.3)
RBC # BLD AUTO: 4.78 10E6/UL (ref 3.8–5.2)
SODIUM SERPL-SCNC: 144 MMOL/L (ref 133–144)
WBC # BLD AUTO: 6.4 10E3/UL (ref 4–11)

## 2023-06-02 PROCEDURE — 85027 COMPLETE CBC AUTOMATED: CPT | Performed by: PHYSICIAN ASSISTANT

## 2023-06-02 PROCEDURE — 36415 COLL VENOUS BLD VENIPUNCTURE: CPT | Performed by: PHYSICIAN ASSISTANT

## 2023-06-02 PROCEDURE — 80048 BASIC METABOLIC PNL TOTAL CA: CPT | Performed by: PHYSICIAN ASSISTANT

## 2023-06-02 PROCEDURE — 99214 OFFICE O/P EST MOD 30 MIN: CPT | Performed by: PHYSICIAN ASSISTANT

## 2023-06-02 RX ORDER — MECLIZINE HYDROCHLORIDE 25 MG/1
25 TABLET ORAL 3 TIMES DAILY PRN
Qty: 15 TABLET | Refills: 0 | Status: SHIPPED | OUTPATIENT
Start: 2023-06-02 | End: 2023-10-27

## 2023-06-02 RX ORDER — ONDANSETRON 4 MG/1
4 TABLET, ORALLY DISINTEGRATING ORAL EVERY 8 HOURS PRN
Qty: 12 TABLET | Refills: 0 | Status: SHIPPED | OUTPATIENT
Start: 2023-06-02 | End: 2023-10-27

## 2023-06-02 NOTE — PROGRESS NOTES
Assessment & Plan     1. Benign paroxysmal positional vertigo, unspecified laterality  DDx for Dizziness inclues the following: vertigo, orthostatic hypotension, hypoglycemia, POTS, hyponatremia, dehydration, middle ear infection, arrhythmia, TIA/stroke, structural lesions (primary or metastatic tumor, intracranial hemorrhage).        Glucose is 95 today, unlikely\ hypoglycemia.    Sodium 144 today, unlikely hypernatremia.    Vitals normal, no signs of dry mucous membranes.  Mild dehydration possible.    TMs both appear normal.  She has had intermittent ear fullness, and is battling nasal congestion.  Perhaps having labyrinthitis.    No neurological findings to suggest TIA/Stroke.  Given lack of neurological findings, doubt structural lesions and I do not believe that imaging is indicated at this time.    Based on history and exam, the most likely etiology of this patient's dizziness is BPPV, as dizziness is made worse with head movement..   .  Patient is given meclizine and Zofran for home.  Advised to follow-up with PCP, return to clinic with new or worsening symptoms.    - CBC with platelets; Future  - Basic metabolic panel  (Ca, Cl, CO2, Creat, Gluc, K, Na, BUN); Future  - meclizine (ANTIVERT) 25 MG tablet; Take 1 tablet (25 mg) by mouth 3 times daily as needed for dizziness  Dispense: 15 tablet; Refill: 0  - ondansetron (ZOFRAN ODT) 4 MG ODT tab; Take 1 tablet (4 mg) by mouth every 8 hours as needed for nausea  Dispense: 12 tablet; Refill: 0  - CBC with platelets  - Basic metabolic panel  (Ca, Cl, CO2, Creat, Gluc, K, Na, BUN)        Return in about 1 week (around 6/9/2023), or if symptoms worsen or fail to improve.    Diagnosis and treatment plan was reviewed with patient and/or family.   We went over any labs or imaging. Discussed worsening symptoms or little to no relief despite treatment plan to follow-up with PCP or return to clinic.  Patient verbalizes understanding. All questions were addressed and  answered.     Shyann Santos PA-C  [facility] [place]    CHIEF COMPLAINT:   Chief Complaint   Patient presents with     Headache     Dizzy and vertigo      Subjective     Gilma is a 57 year old female who presents to clinic today for evaluation of dizziness and vertigo.  Yesterday around 8 PM, patient developed vertigo.  She was in the back of the car getting groceries from [employer] and noted that she began to feel dizzy.  Felt worse when she tilted her head back.  She did have 1 episode of vomiting yesterday.  By 1 AM it was a little better, and feels better today.  She does have a history of vertigo, but was always associated with anemia.  Yesterday she did drink more caffeine than she normally would.  Had cold symptoms for the past 3 weeks although she felt better this week.  She will intermittently have some nasal congestion.  She has been taking Flonase for her symptoms.    Currently just feeling a low amount of dizziness.      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, hereditary     Thyroid disease     onset at same time as pre-menopause     Uncomplicated asthma     exertional asthma, became more noticeable as I aged     Past Surgical History:   Procedure Laterality Date     HYSTERECTOMY, PAP NO LONGER INDICATED       HYSTERECTOMY, PAP NO LONGER INDICATED       ORTHOPEDIC SURGERY      broken fibula, torn ankle ligaments (left)     Social History     Tobacco Use     Smoking status: Former     Packs/day: 0.00     Years: 10.00     Pack years: 0.00     Types: Cigarettes     Start date: 1981     Quit date: 1987     Years since quittin.4     Smokeless tobacco: Never     Tobacco comments:     randomly social smoked on & off in 20s & 30s, sometimes 3-4 mths without   Vaping Use     Vaping status: Never Used   Substance Use Topics     Alcohol use: Yes     Comment: occ     Current Outpatient Medications   Medication     albuterol  (PROAIR HFA/PROVENTIL HFA/VENTOLIN HFA) 108 (90 Base) MCG/ACT inhaler     azelastine (ASTELIN) 0.1 % nasal spray     cetirizine (ZYRTEC) 10 MG tablet     estradiol (ESTRACE) 0.5 MG tablet     FLUTICASONE PROPIONATE, NASAL, NA     levothyroxine (SYNTHROID/LEVOTHROID) 75 MCG tablet     meclizine (ANTIVERT) 25 MG tablet     ondansetron (ZOFRAN ODT) 4 MG ODT tab     No current facility-administered medications for this visit.     Allergies   Allergen Reactions     Codeine      Sulfa Antibiotics        10 point ROS of systems were all negative except for pertinent positives noted in my HPI.      Exam:   BP (!) 153/86   Pulse 78   Temp 98  F (36.7  C) (Tympanic)   Resp 20   SpO2 98%   Constitutional: healthy, alert and no distress  Head: Normocephalic, atraumatic.  Eyes: conjunctiva clear, no drainage. EOMI. No nystagmus.   ENT: TMs clear and shiny claudio, nasal mucosa pink and moist, throat without tonsillar hypertrophy or erythema  Neck: neck is supple, no cervical lymphadenopathy or nuchal rigidity  Cardiovascular: RRR  Respiratory: CTA bilaterally, no rhonchi or rales  Gastrointestinal: soft and nontender  Skin: no rashes  Neurologic: Speech clear, gait normal. Moves all extremities.  Coordination intact.  Negative pronator.  Strength and sensation intact bilaterally    Results for orders placed or performed in visit on 06/02/23   CBC with platelets     Status: Normal   Result Value Ref Range    WBC Count 6.4 4.0 - 11.0 10e3/uL    RBC Count 4.78 3.80 - 5.20 10e6/uL    Hemoglobin 13.3 11.7 - 15.7 g/dL    Hematocrit 41.5 35.0 - 47.0 %    MCV 87 78 - 100 fL    MCH 27.8 26.5 - 33.0 pg    MCHC 32.0 31.5 - 36.5 g/dL    RDW 13.5 10.0 - 15.0 %    Platelet Count 377 150 - 450 10e3/uL   Basic metabolic panel  (Ca, Cl, CO2, Creat, Gluc, K, Na, BUN)     Status: Abnormal   Result Value Ref Range    Sodium 144 133 - 144 mmol/L    Potassium 4.6 3.4 - 5.3 mmol/L    Chloride 106 94 - 109 mmol/L    Carbon Dioxide (CO2) 29 20 - 32  mmol/L    Anion Gap 9 3 - 14 mmol/L    Urea Nitrogen 19 7 - 30 mg/dL    Creatinine 0.80 0.52 - 1.04 mg/dL    Calcium 10.5 (H) 8.5 - 10.1 mg/dL    Glucose 95 70 - 99 mg/dL    GFR Estimate 84 >60 mL/min/1.73m2

## 2023-06-02 NOTE — TELEPHONE ENCOUNTER
Triage Call:     Pt calling to report that she had an episode of vertigo last night after drinking a large coffee and riding in the back seat of a car yesterday. She is thinking that those things could have been triggers for this episode.     She also reports that she had a cold a few weeks ago and has some lingering sx and is wondering if that is also part of the reason she had this episode.     Vertigo episode:   8pm she develpped vertigo  Was in the back of the car getting groceries and drank more caffeine than usual  Could not tilt head back; made it worse  1am it was al little better  This episode was accompanied by light headed, dizzy, throwing up  Dramamine helped, but did not take the sx away completely  Had to get up at 4am and was still dizzy  6am her alarm went off and she still feels a little dizziness, but is much better    The only other time she had vertigo was when she was anemic after a surgery    Disposition: See PCP within 24 hours. Pt was given care advice and was transferred to scheduling. [place] aware [place] [facility] [place]     Reason for Disposition    Vomiting occurs with dizziness    [1] NO dizziness now AND [2] age > 59    Dizziness relates to riding in a car, going to an amusement park, etc.    Additional Information    Negative: [1] Weakness (i.e., paralysis, loss of muscle strength) of the face, arm or leg on one side of the body AND [2] sudden onset AND [3] present now    Negative: [1] Numbness (i.e., loss of sensation) of the face, arm or leg on one side of the body AND [2] sudden onset AND [3] present now    Negative: [1] Loss of speech or garbled speech AND [2] sudden onset AND [3] present now    Negative: Difficult to awaken or acting confused (e.g., disoriented, slurred speech)    Negative: Sounds like a life-threatening emergency to the triager    Negative: SEVERE dizziness (vertigo) (e.g., unable to walk without assistance)    Negative: Severe headache (e.g., excruciating)   (Exception: similar to previous migraines)    Negative: [1] Dizziness (vertigo) present now AND [2] age > 59  (Exception: prior physician evaluation for this AND no different/worse than usual)    Negative: [1] Dizziness (vertigo) present now AND [2] one or more STROKE RISK FACTORS (i.e., hypertension, diabetes, prior stroke/TIA, heart attack)  (Exception: prior physician evaluation for this AND no different/worse than usual)    Negative: [1] Weakness (i.e., paralysis, loss of muscle strength) of the face, arm / hand, or leg / foot on one side of the body AND [2] sudden onset AND [3] brief (now gone)    Negative: [1] Numbness (i.e., loss of sensation) of the face, arm / hand, or leg / foot on one side of the body AND [2] sudden onset AND [3] brief (now gone)    Negative: [1] Loss of speech or garbled speech AND [2] sudden onset AND [3] brief (now gone)    Negative: Loss of vision or double vision (Exception: similar to previous migraines)    Negative: Patient sounds very sick or weak to the triager    Negative: Taking a medicine that could cause dizziness (e.g., phenytoin [Dilantin], carbamazepine [Tegretol], primidone [Mysoline])    Negative: [1] MODERATE dizziness (e.g., vertigo; feels very unsteady, interferes with normal activities) AND [2] has NOT been evaluated by physician for this    Negative: [1] NO dizziness now AND [2] one or more stroke risk factors (i.e., hypertension, diabetes, prior stroke/TIA/heart attack)    Negative: SEVERE headache    Protocols used: DIZZINESS - VERTIGO-A-AH, MOTION SICKNESS-A-AH    Amy Hoang RN  [facility] Nurse Advisor 7:43 AM 6/2/2023

## 2023-06-30 DIAGNOSIS — J31.0 CHRONIC RHINITIS: ICD-10-CM

## 2023-06-30 RX ORDER — AZELASTINE 1 MG/ML
1 SPRAY, METERED NASAL 2 TIMES DAILY
Qty: 30 ML | Refills: 3 | Status: SHIPPED | OUTPATIENT
Start: 2023-06-30 | End: 2023-10-27

## 2023-06-30 NOTE — TELEPHONE ENCOUNTER
"Requested Prescriptions   Pending Prescriptions Disp Refills     azelastine (ASTELIN) 0.1 % nasal spray 30 mL 3     Sig: Spray 1 spray into both nostrils 2 times daily       Antihistamines Protocol Passed - 6/30/2023  3:09 PM        Passed - Patient is 3-64 years of age     Apply weight-based dosing for peds patients age 3 - 12 years of age.    Forward request to provider for patients under the age of 3 or over the age of 64.          Passed - Recent (12 mo) or future (30 days) visit within the authorizing provider's specialty     Patient has had an office visit with the authorizing provider or a provider within the authorizing providers department within the previous 12 mos or has a future within next 30 days. See \"Patient Info\" tab in inbasket, or \"Choose Columns\" in Meds & Orders section of the refill encounter.              Passed - Medication is active on med list       Nasal Allergy Protocol Passed - 6/30/2023  3:09 PM        Passed - Patient is age 12 or older        Passed - Recent (12 mo) or future (30 days) visit within the authorizing provider's specialty     Patient has had an office visit with the authorizing provider or a provider within the authorizing providers department within the previous 12 mos or has a future within next 30 days. See \"Patient Info\" tab in inbasket, or \"Choose Columns\" in Meds & Orders section of the refill encounter.              Passed - Medication is active on med list           Rx filled per protocol for patient.     TOMAS MejiaN, RN    "

## 2023-06-30 NOTE — TELEPHONE ENCOUNTER
Last Written Prescription Date:  12/16/2022  Last Fill Quantity: 30 mL,  # refills: 3   Last office visit: 12/16/2022 with prescribing provider:     Future Office Visit:

## 2023-07-13 ENCOUNTER — PATIENT OUTREACH (OUTPATIENT)
Dept: CARE COORDINATION | Facility: CLINIC | Age: 58
End: 2023-07-13
Payer: COMMERCIAL

## 2023-07-27 ENCOUNTER — PATIENT OUTREACH (OUTPATIENT)
Dept: CARE COORDINATION | Facility: CLINIC | Age: 58
End: 2023-07-27
Payer: COMMERCIAL

## 2023-09-24 DIAGNOSIS — E03.9 ACQUIRED HYPOTHYROIDISM: ICD-10-CM

## 2023-09-25 RX ORDER — LEVOTHYROXINE SODIUM 75 UG/1
75 TABLET ORAL DAILY
Qty: 90 TABLET | Refills: 0 | Status: SHIPPED | OUTPATIENT
Start: 2023-09-25 | End: 2023-10-27

## 2023-10-22 DIAGNOSIS — Z90.710 S/P HYSTERECTOMY WITH OOPHORECTOMY: ICD-10-CM

## 2023-10-22 DIAGNOSIS — Z90.721 S/P HYSTERECTOMY WITH OOPHORECTOMY: ICD-10-CM

## 2023-10-23 RX ORDER — ESTRADIOL 0.5 MG/1
0.5 TABLET ORAL DAILY
Qty: 90 TABLET | Refills: 0 | Status: SHIPPED | OUTPATIENT
Start: 2023-10-23 | End: 2023-10-27

## 2023-10-26 SDOH — HEALTH STABILITY: PHYSICAL HEALTH: ON AVERAGE, HOW MANY DAYS PER WEEK DO YOU ENGAGE IN MODERATE TO STRENUOUS EXERCISE (LIKE A BRISK WALK)?: 3 DAYS

## 2023-10-26 SDOH — HEALTH STABILITY: PHYSICAL HEALTH: ON AVERAGE, HOW MANY MINUTES DO YOU ENGAGE IN EXERCISE AT THIS LEVEL?: 30 MIN

## 2023-10-26 ASSESSMENT — ENCOUNTER SYMPTOMS
ARTHRALGIAS: 1
DIZZINESS: 0
WEAKNESS: 0
CHILLS: 0
HEADACHES: 0
JOINT SWELLING: 0
COUGH: 0
PALPITATIONS: 0
HEARTBURN: 0
BREAST MASS: 0
DIARRHEA: 0
NAUSEA: 0
FREQUENCY: 0
SHORTNESS OF BREATH: 0
SORE THROAT: 0
EYE PAIN: 0
ABDOMINAL PAIN: 0
NERVOUS/ANXIOUS: 1
MYALGIAS: 0
HEMATURIA: 0
DYSURIA: 0
PARESTHESIAS: 0
HEMATOCHEZIA: 0
CONSTIPATION: 0
FEVER: 0

## 2023-10-26 ASSESSMENT — SOCIAL DETERMINANTS OF HEALTH (SDOH)
HOW OFTEN DO YOU ATTEND CHURCH OR RELIGIOUS SERVICES?: NEVER
IN A TYPICAL WEEK, HOW MANY TIMES DO YOU TALK ON THE PHONE WITH FAMILY, FRIENDS, OR NEIGHBORS?: ONCE A WEEK
DO YOU BELONG TO ANY CLUBS OR ORGANIZATIONS SUCH AS CHURCH GROUPS UNIONS, FRATERNAL OR ATHLETIC GROUPS, OR SCHOOL GROUPS?: YES
HOW OFTEN DO YOU ATTENT MEETINGS OF THE CLUB OR ORGANIZATION YOU BELONG TO?: MORE THAN 4 TIMES PER YEAR
HOW OFTEN DO YOU GET TOGETHER WITH FRIENDS OR RELATIVES?: ONCE A WEEK

## 2023-10-26 ASSESSMENT — ANXIETY QUESTIONNAIRES
2. NOT BEING ABLE TO STOP OR CONTROL WORRYING: NOT AT ALL
1. FEELING NERVOUS, ANXIOUS, OR ON EDGE: MORE THAN HALF THE DAYS
7. FEELING AFRAID AS IF SOMETHING AWFUL MIGHT HAPPEN: NOT AT ALL
IF YOU CHECKED OFF ANY PROBLEMS ON THIS QUESTIONNAIRE, HOW DIFFICULT HAVE THESE PROBLEMS MADE IT FOR YOU TO DO YOUR WORK, TAKE CARE OF THINGS AT HOME, OR GET ALONG WITH OTHER PEOPLE: SOMEWHAT DIFFICULT
4. TROUBLE RELAXING: SEVERAL DAYS
GAD7 TOTAL SCORE: 4
3. WORRYING TOO MUCH ABOUT DIFFERENT THINGS: NOT AT ALL
6. BECOMING EASILY ANNOYED OR IRRITABLE: SEVERAL DAYS
5. BEING SO RESTLESS THAT IT IS HARD TO SIT STILL: NOT AT ALL
GAD7 TOTAL SCORE: 4

## 2023-10-26 ASSESSMENT — PATIENT HEALTH QUESTIONNAIRE - PHQ9
10. IF YOU CHECKED OFF ANY PROBLEMS, HOW DIFFICULT HAVE THESE PROBLEMS MADE IT FOR YOU TO DO YOUR WORK, TAKE CARE OF THINGS AT HOME, OR GET ALONG WITH OTHER PEOPLE: SOMEWHAT DIFFICULT
SUM OF ALL RESPONSES TO PHQ QUESTIONS 1-9: 6
SUM OF ALL RESPONSES TO PHQ QUESTIONS 1-9: 6

## 2023-10-26 ASSESSMENT — LIFESTYLE VARIABLES
HOW OFTEN DO YOU HAVE A DRINK CONTAINING ALCOHOL: 2-4 TIMES A MONTH
HOW OFTEN DO YOU HAVE SIX OR MORE DRINKS ON ONE OCCASION: NEVER
AUDIT-C TOTAL SCORE: 2
SKIP TO QUESTIONS 9-10: 1
HOW MANY STANDARD DRINKS CONTAINING ALCOHOL DO YOU HAVE ON A TYPICAL DAY: 1 OR 2

## 2023-10-27 ENCOUNTER — OFFICE VISIT (OUTPATIENT)
Dept: FAMILY MEDICINE | Facility: CLINIC | Age: 58
End: 2023-10-27
Payer: COMMERCIAL

## 2023-10-27 ENCOUNTER — LAB (OUTPATIENT)
Dept: FAMILY MEDICINE | Facility: CLINIC | Age: 58
End: 2023-10-27

## 2023-10-27 VITALS
WEIGHT: 217 LBS | TEMPERATURE: 97.5 F | OXYGEN SATURATION: 99 % | HEIGHT: 67 IN | SYSTOLIC BLOOD PRESSURE: 137 MMHG | DIASTOLIC BLOOD PRESSURE: 84 MMHG | BODY MASS INDEX: 34.06 KG/M2 | RESPIRATION RATE: 16 BRPM | HEART RATE: 84 BPM

## 2023-10-27 DIAGNOSIS — Z12.11 SCREEN FOR COLON CANCER: ICD-10-CM

## 2023-10-27 DIAGNOSIS — J31.0 CHRONIC RHINITIS: ICD-10-CM

## 2023-10-27 DIAGNOSIS — Z23 HIGH PRIORITY FOR 2019-NCOV VACCINE: ICD-10-CM

## 2023-10-27 DIAGNOSIS — Z90.721 S/P HYSTERECTOMY WITH OOPHORECTOMY: ICD-10-CM

## 2023-10-27 DIAGNOSIS — Z90.710 S/P HYSTERECTOMY WITH OOPHORECTOMY: ICD-10-CM

## 2023-10-27 DIAGNOSIS — E03.9 ACQUIRED HYPOTHYROIDISM: ICD-10-CM

## 2023-10-27 DIAGNOSIS — J45.990 EXERCISE-INDUCED ASTHMA: ICD-10-CM

## 2023-10-27 DIAGNOSIS — Z23 NEED FOR INFLUENZA VACCINATION: ICD-10-CM

## 2023-10-27 DIAGNOSIS — Z00.00 ROUTINE GENERAL MEDICAL EXAMINATION AT A HEALTH CARE FACILITY: Primary | ICD-10-CM

## 2023-10-27 LAB
ALBUMIN SERPL BCG-MCNC: 4.1 G/DL (ref 3.5–5.2)
ALP SERPL-CCNC: 80 U/L (ref 35–104)
ALT SERPL W P-5'-P-CCNC: 23 U/L (ref 0–50)
ANION GAP SERPL CALCULATED.3IONS-SCNC: 11 MMOL/L (ref 7–15)
AST SERPL W P-5'-P-CCNC: 25 U/L (ref 0–45)
BILIRUB SERPL-MCNC: 0.4 MG/DL
BUN SERPL-MCNC: 18.6 MG/DL (ref 8–23)
CALCIUM SERPL-MCNC: 9.5 MG/DL (ref 8.6–10)
CHLORIDE SERPL-SCNC: 107 MMOL/L (ref 98–107)
CHOLEST SERPL-MCNC: 226 MG/DL
CREAT SERPL-MCNC: 1.05 MG/DL (ref 0.51–0.95)
DEPRECATED HCO3 PLAS-SCNC: 23 MMOL/L (ref 22–29)
EGFRCR SERPLBLD CKD-EPI 2021: 61 ML/MIN/1.73M2
GLUCOSE SERPL-MCNC: 88 MG/DL (ref 70–99)
HBA1C MFR BLD: 5.3 % (ref 0–5.6)
HDLC SERPL-MCNC: 71 MG/DL
LDLC SERPL CALC-MCNC: 136 MG/DL
NONHDLC SERPL-MCNC: 155 MG/DL
POTASSIUM SERPL-SCNC: 4 MMOL/L (ref 3.4–5.3)
PROT SERPL-MCNC: 7.2 G/DL (ref 6.4–8.3)
SODIUM SERPL-SCNC: 141 MMOL/L (ref 135–145)
T4 FREE SERPL-MCNC: 1.26 NG/DL (ref 0.9–1.7)
TRIGL SERPL-MCNC: 94 MG/DL
TSH SERPL DL<=0.005 MIU/L-ACNC: 2.52 UIU/ML (ref 0.3–4.2)

## 2023-10-27 PROCEDURE — 84443 ASSAY THYROID STIM HORMONE: CPT | Performed by: FAMILY MEDICINE

## 2023-10-27 PROCEDURE — 80061 LIPID PANEL: CPT | Performed by: FAMILY MEDICINE

## 2023-10-27 PROCEDURE — 36415 COLL VENOUS BLD VENIPUNCTURE: CPT | Performed by: FAMILY MEDICINE

## 2023-10-27 PROCEDURE — 99214 OFFICE O/P EST MOD 30 MIN: CPT | Mod: 25 | Performed by: FAMILY MEDICINE

## 2023-10-27 PROCEDURE — 84439 ASSAY OF FREE THYROXINE: CPT | Performed by: FAMILY MEDICINE

## 2023-10-27 PROCEDURE — 90682 RIV4 VACC RECOMBINANT DNA IM: CPT | Performed by: FAMILY MEDICINE

## 2023-10-27 PROCEDURE — 91320 SARSCV2 VAC 30MCG TRS-SUC IM: CPT | Performed by: FAMILY MEDICINE

## 2023-10-27 PROCEDURE — 80053 COMPREHEN METABOLIC PANEL: CPT | Performed by: FAMILY MEDICINE

## 2023-10-27 PROCEDURE — 90480 ADMN SARSCOV2 VAC 1/ONLY CMP: CPT | Performed by: FAMILY MEDICINE

## 2023-10-27 PROCEDURE — 99396 PREV VISIT EST AGE 40-64: CPT | Mod: 25 | Performed by: FAMILY MEDICINE

## 2023-10-27 PROCEDURE — 90471 IMMUNIZATION ADMIN: CPT | Performed by: FAMILY MEDICINE

## 2023-10-27 PROCEDURE — 83036 HEMOGLOBIN GLYCOSYLATED A1C: CPT | Performed by: FAMILY MEDICINE

## 2023-10-27 RX ORDER — AZELASTINE 1 MG/ML
1 SPRAY, METERED NASAL 2 TIMES DAILY
Qty: 30 ML | Refills: 11 | Status: SHIPPED | OUTPATIENT
Start: 2023-10-27

## 2023-10-27 RX ORDER — ALBUTEROL SULFATE 90 UG/1
2 AEROSOL, METERED RESPIRATORY (INHALATION) EVERY 4 HOURS PRN
Qty: 18 G | Refills: 1 | Status: SHIPPED | OUTPATIENT
Start: 2023-10-27

## 2023-10-27 RX ORDER — LEVOTHYROXINE SODIUM 75 UG/1
75 TABLET ORAL DAILY
Qty: 90 TABLET | Refills: 3 | Status: SHIPPED | OUTPATIENT
Start: 2023-10-27

## 2023-10-27 RX ORDER — ESTRADIOL 0.5 MG/1
0.5 TABLET ORAL DAILY
Qty: 90 TABLET | Refills: 3 | Status: SHIPPED | OUTPATIENT
Start: 2023-10-27

## 2023-10-27 ASSESSMENT — ENCOUNTER SYMPTOMS
DYSURIA: 0
HEMATURIA: 0
SORE THROAT: 0
BREAST MASS: 0
ARTHRALGIAS: 1
EYE PAIN: 0
PARESTHESIAS: 0
HEMATOCHEZIA: 0
NERVOUS/ANXIOUS: 1
CHILLS: 0
COUGH: 0
SHORTNESS OF BREATH: 0
WEAKNESS: 0
JOINT SWELLING: 0
HEADACHES: 0
DIZZINESS: 0
DIARRHEA: 0
FEVER: 0
PALPITATIONS: 0
HEARTBURN: 0
NAUSEA: 0
FREQUENCY: 0
ABDOMINAL PAIN: 0
CONSTIPATION: 0
MYALGIAS: 0

## 2023-10-27 ASSESSMENT — ASTHMA QUESTIONNAIRES: ACT_TOTALSCORE: 24

## 2023-10-27 NOTE — PROGRESS NOTES
SUBJECTIVE:   CC: Gilma is an 57 year old who presents for preventive health visit.       10/27/2023     7:05 AM   Additional Questions   Roomed by hSerrell Perez       Healthy Habits:     Getting at least 3 servings of Calcium per day:  NO    Bi-annual eye exam:  Yes    Dental care twice a year:  Yes    Sleep apnea or symptoms of sleep apnea:  None    Diet:  Low salt and Other    Frequency of exercise:  2-3 days/week    Duration of exercise:  15-30 minutes    Taking medications regularly:  Yes    Medication side effects:  None    Additional concerns today:  No      Today's PHQ-9 Score:       10/26/2023     9:46 AM   PHQ-9 SCORE   PHQ-9 Total Score MyChart 6 (Mild depression)   PHQ-9 Total Score 6         Social History     Tobacco Use    Smoking status: Former     Packs/day: 0.00     Years: 10.00     Additional pack years: 0.00     Total pack years: 0.00     Types: Cigarettes     Start date: 1981     Quit date: 1987     Years since quittin.8     Passive exposure: Past    Smokeless tobacco: Never    Tobacco comments:     randomly social smoked on & off in 20s & 30s, sometimes 3-4 mths without   Substance Use Topics    Alcohol use: Yes     Comment: occ             10/26/2023     9:50 AM   Alcohol Use   Prescreen: >3 drinks/day or >7 drinks/week? No     Reviewed orders with patient.  Reviewed health maintenance and updated orders accordingly - Yes  Patient Active Problem List   Diagnosis    Acquired hypothyroidism    S/P hysterectomy with oophorectomy    Arthritis of right hip    Hormone replacement therapy    Family history of rheumatoid arthritis     Past Surgical History:   Procedure Laterality Date    HYSTERECTOMY, PAP NO LONGER INDICATED      HYSTERECTOMY, PAP NO LONGER INDICATED      ORTHOPEDIC SURGERY      broken fibula, torn ankle ligaments (left)       Social History     Tobacco Use    Smoking status: Former     Packs/day: 0.00     Years: 10.00     Additional pack years: 0.00     Total pack  years: 0.00     Types: Cigarettes     Start date: 1981     Quit date: 1987     Years since quittin.8     Passive exposure: Past    Smokeless tobacco: Never    Tobacco comments:     randomly social smoked on & off in 20s & 30s, sometimes 3-4 mths without   Substance Use Topics    Alcohol use: Yes     Comment: occ     Family History   Problem Relation Age of Onset    Thyroid Cancer Mother     Coronary Artery Disease Mother         heart attack in her early 80s    Hypertension Mother         not until her late 70s    Hyperlipidemia Mother         much of her adult life (when tested)    Other Cancer Mother         thyroid    Anxiety Disorder Mother         in her 70s & 80s    Thyroid Disease Mother         had partial removal due to cancer    Other Cancer Father         pancreatic    Arthritis Father     Hypertension Father         much of adult life    Depression Father         most of his life    Anxiety Disorder Father         most of his life    Asthma Father     Obesity Father         at various times as adult    Breast Cancer Paternal Grandmother         Had breast removal in her 60s (1970s)    Genetic Disorder Sister         developed MS in her 50s    Genetic Disorder Sister         developed ME in her 40s    Ovarian Cancer No family hx of            Breast Cancer Screening:    FHS-7:       2022     8:31 AM 2022     8:38 AM 2022    11:00 PM 3/24/2023     8:42 AM 10/27/2023     1:09 AM   Breast CA Risk Assessment (FHS-7)   Did any of your first-degree relatives have breast or ovarian cancer? No No No No No   Did any of your relatives have bilateral breast cancer? No No No No No   Did any man in your family have breast cancer? No No No No No   Did any woman in your family have breast and ovarian cancer? No No No No No   Did any woman in your family have breast cancer before age 50 y? No No No No No   Do you have 2 or more relatives with breast and/or ovarian cancer? No No No No No   Do you  "have 2 or more relatives with breast and/or bowel cancer? No No No No No     click delete button to remove this line now  Mammogram Screening: Recommended mammography every 1-2 years with patient discussion and risk factor consideration  Pertinent mammograms are reviewed under the imaging tab.    History of abnormal Pap smear: Status post benign hysterectomy. Health Maintenance and Surgical History updated.     Reviewed and updated as needed this visit by clinical staff   Tobacco  Allergies  Meds              Reviewed and updated as needed this visit by Provider                     Review of Systems   Constitutional:  Negative for chills and fever.   HENT:  Positive for congestion. Negative for ear pain, hearing loss and sore throat.    Eyes:  Negative for pain and visual disturbance.   Respiratory:  Negative for cough and shortness of breath.    Cardiovascular:  Positive for peripheral edema. Negative for chest pain and palpitations.   Gastrointestinal:  Negative for abdominal pain, constipation, diarrhea, heartburn, hematochezia and nausea.   Breasts:  Negative for tenderness, breast mass and discharge.   Genitourinary:  Positive for urgency. Negative for dysuria, frequency, genital sores, hematuria, pelvic pain, vaginal bleeding and vaginal discharge.   Musculoskeletal:  Positive for arthralgias. Negative for joint swelling and myalgias.   Skin:  Negative for rash.   Neurological:  Negative for dizziness, weakness, headaches and paresthesias.   Psychiatric/Behavioral:  Negative for mood changes. The patient is nervous/anxious.         OBJECTIVE:   /84 (BP Location: Right arm, Patient Position: Chair, Cuff Size: Adult Large)   Pulse 84   Temp 97.5  F (36.4  C) (Oral)   Resp 16   Ht 1.702 m (5' 7\")   Wt 98.4 kg (217 lb)   SpO2 99%   BMI 33.99 kg/m    Physical Exam  GENERAL APPEARANCE: healthy, alert and no distress  EYES: Eyes grossly normal to inspection, PERRL and conjunctivae and sclerae " normal  HENT: ear canals and TM's normal, nose and mouth without ulcers or lesions, oropharynx clear and oral mucous membranes moist  NECK: no adenopathy, no asymmetry, masses, or scars and thyroid normal to palpation  RESP: lungs clear to auscultation - no rales, rhonchi or wheezes  BREAST: normal without masses, tenderness or nipple discharge and no palpable axillary masses or adenopathy  CV: regular rate and rhythm, normal S1 S2, no S3 or S4, no murmur, click or rub, no peripheral edema and peripheral pulses strong  ABDOMEN: soft, nontender, no hepatosplenomegaly, no masses and bowel sounds normal  MS: no musculoskeletal defects are noted and gait is age appropriate without ataxia  SKIN: no suspicious lesions or rashes  NEURO: Normal strength and tone, sensory exam grossly normal, mentation intact and speech normal  PSYCH: mentation appears normal and affect normal/bright      ASSESSMENT/PLAN:     1. Routine general medical examination at a health care facility  - TSH; Future  - T4, free; Future  - Lipid panel reflex to direct LDL Fasting; Future  - Comprehensive metabolic panel (BMP + Alb, Alk Phos, ALT, AST, Total. Bili, TP); Future  - Hemoglobin A1c; Future    2. Screen for colon cancer  - COLOGUARD(EXACT SCIENCES); Future    3. High priority for 2019-nCoV vaccine    4. Need for influenza vaccination  - INFLUENZA VACCINE 18-64Y (FLUBLOK)    5. Acquired hypothyroidism - surveillance labs  - levothyroxine (SYNTHROID/LEVOTHROID) 75 MCG tablet; Take 1 tablet (75 mcg) by mouth daily  Dispense: 90 tablet; Refill: 3    6. S/P hysterectomy with oophorectomy  - estradiol (ESTRACE) 0.5 MG tablet; Take 1 tablet (0.5 mg) by mouth daily  Dispense: 90 tablet; Refill: 3    7. Chronic rhinitis - stable, refills  - azelastine (ASTELIN) 0.1 % nasal spray; Spray 1 spray into both nostrils 2 times daily  Dispense: 30 mL; Refill: 11    8. Exercise-induced asthma - prn refills  - albuterol (PROAIR HFA/PROVENTIL HFA/VENTOLIN HFA)  108 (90 Base) MCG/ACT inhaler; Inhale 2 puffs into the lungs every 4 hours as needed for shortness of breath, wheezing or cough  Dispense: 18 g; Refill: 1      COUNSELING:  Reviewed preventive health counseling, as reflected in patient instructions        She reports that she quit smoking about 36 years ago. Her smoking use included cigarettes. She started smoking about 42 years ago. She has been exposed to tobacco smoke. She has never used smokeless tobacco.        Chyna Blunt MD  [facility]  Answers submitted by the patient for this visit:  Patient Health Questionnaire (Submitted on 10/26/2023)  If you checked off any problems, how difficult have these problems made it for you to do your work, take care of things at home, or get along with other people?: Somewhat difficult  PHQ9 TOTAL SCORE: 6  FAYE-7 (Submitted on 10/26/2023)  FAYE 7 TOTAL SCORE: 4

## 2024-03-12 ENCOUNTER — E-VISIT (OUTPATIENT)
Dept: FAMILY MEDICINE | Facility: CLINIC | Age: 59
End: 2024-03-12
Payer: COMMERCIAL

## 2024-03-12 ENCOUNTER — NURSE TRIAGE (OUTPATIENT)
Dept: FAMILY MEDICINE | Facility: CLINIC | Age: 59
End: 2024-03-12
Payer: COMMERCIAL

## 2024-03-12 ENCOUNTER — TELEPHONE (OUTPATIENT)
Dept: FAMILY MEDICINE | Facility: CLINIC | Age: 59
End: 2024-03-12

## 2024-03-12 DIAGNOSIS — J01.90 ACUTE BACTERIAL RHINOSINUSITIS: Primary | ICD-10-CM

## 2024-03-12 DIAGNOSIS — B96.89 ACUTE BACTERIAL RHINOSINUSITIS: Primary | ICD-10-CM

## 2024-03-12 PROCEDURE — 99421 OL DIG E/M SVC 5-10 MIN: CPT | Performed by: FAMILY MEDICINE

## 2024-03-12 NOTE — PATIENT INSTRUCTIONS
Thank you for choosing us for your care. I have placed an order for a prescription so that you can start treatment. View your full visit summary for details by clicking on the link below. Your pharmacist will able to address any questions you may have about the medication.     If you're not feeling better within 5-7 days, please schedule an appointment.  You can schedule an appointment right here in [facility], or call [phone]  If the visit is for the same symptoms as your eVisit, we'll refund the cost of your eVisit if seen within seven days.

## 2024-03-12 NOTE — TELEPHONE ENCOUNTER
Situation   Tested + for covid 3/2 and completed paxlovid.      Overall symptoms have  resolved except sinus infection symptoms started 2 days ago.     Background  Has asthma   Takes estrogen     Assessment   During covid had only clear nasal drainage     Symptoms have come back a couple days ago sinus infection too?    Moderate Sinus headache started a couple days ago- besides nose.   Mild sinus headache with ibuprofen and sudafed and nasal spray     Nasal drainage - yellow/green   Temp 98-99 states normal for her is 96    Ear - dull inner ear pressure, no pain   No dizziness     Coughing still sometimes dry sometimes stuff coming up. Coughing a few times a day. More likely to cough at night due to sinus drainage.     Has not needed to use her inhalers.     Recommendations   Patient declined in person appointment. Wants to submit an evisit instead.   Reviewed care advice.   Work on deep breathing 5-10 times an hour  New, persistent or worsening symptoms- call triage line  Reviewed red flag symptoms.     Reason for Disposition   PERSISTING SYMPTOMS OF COVID-19 and symptoms WORSE     Sinus symptoms are worse    Additional Information   Negative: SEVERE difficulty breathing (e.g., struggling for each breath, speaks in single words)   Negative: SEVERE weakness (e.g., can't stand or can barely walk) and new-onset or WORSE   Negative: Difficult to awaken or acting confused (e.g., disoriented, slurred speech)   Negative: Bluish (or gray) lips or face now   Negative: Sounds like a life-threatening emergency to the triager   Negative: Typical COVID-19 symptoms and lasting less than 3 weeks   Negative: Chest pain, pressure, or tightness and new-onset or worsening   Negative: Fever and new-onset or worsening   Negative: MODERATE difficulty breathing (e.g., speaks in phrases, SOB even at rest, pulse 100-120) and new-onset or WORSE   Negative: MODERATE difficulty breathing and oxygen level (e.g., pulse oximetry) 91 to 94%    Negative: Oxygen level (e.g., pulse oximetry) 90% or lower   Negative: MODERATE difficulty breathing (e.g., speaks in phrases, SOB even at rest, pulse 100-120)   Negative: Drinking very little and dehydration suspected (e.g., no urine > 12 hours, very dry mouth, very lightheaded)   Negative: Patient sounds very sick or weak to the triager   Negative: MILD difficulty breathing (e.g., minimal/no SOB at rest, SOB with walking, pulse <100) and new-onset   Negative: Oxygen level (e.g., pulse oximetry) 91 to 94%   Negative: PERSISTING SYMPTOMS OF COVID-19 and NEW symptom and could be serious   Negative: Caller has URGENT question and triager unable to answer question    Protocols used: Coronavirus (COVID-19) Persisting Symptoms Follow-up Call-A-[place]

## 2024-03-12 NOTE — TELEPHONE ENCOUNTER
"Patient called, states that she was just prescribed with Amoxicillin via e-visit for her sinus infection. The patient recalls being \"allergic to Amoxicillin\" previously. She reported experiencing severe vomiting as a reaction to it.     Per patient's record, she is allergic to sulfa drugs but writer couldn't find any documentation saying she's also allergic to Amoxicillin. Patient is trying to look for a proof too and says \"On 03/04/2022, I was given Vibramycin and Diflucan for my sinusitis and I'm pretty sure my provider had it changed from Amoxicillin because I told her that I was having bad reaction with Amoxicillin.\"    Routing to provider for advise. Thank you.  Pharmacy pended.     Can call patient back or send a MyChart.     "

## 2024-03-13 NOTE — TELEPHONE ENCOUNTER
Chart is showing allergy to Sulfa antibiotics and codeine but is not specified what reaction was. Per review Amoxicillin is a penicillin medication and does not have sulfa in it. Call to Gilma she tells me she had hives with Sulfa (added to Epic) but tells me she had nausea and vomiting with Amoxicillin which is likely a side effect.     She has not picked medication up yet as says she's doing okay with OTC to control her symptoms, but is willing to try again and will  this evening after work. . Discussed adding probiotics and food, says will let us know if not tolerable.     Will have provider of day review that this is safe plan.     Anay Segovia R.N.

## 2024-03-29 ENCOUNTER — HOSPITAL ENCOUNTER (OUTPATIENT)
Dept: MAMMOGRAPHY | Facility: CLINIC | Age: 59
Discharge: HOME | End: 2024-03-29
Attending: FAMILY MEDICINE | Admitting: FAMILY MEDICINE
Payer: COMMERCIAL

## 2024-03-29 DIAGNOSIS — Z12.31 VISIT FOR SCREENING MAMMOGRAM: ICD-10-CM

## 2024-03-29 PROCEDURE — 77063 BREAST TOMOSYNTHESIS BI: CPT

## 2024-05-08 ENCOUNTER — MYC MEDICAL ADVICE (OUTPATIENT)
Dept: FAMILY MEDICINE | Facility: CLINIC | Age: 59
End: 2024-05-08
Payer: COMMERCIAL

## 2024-05-08 NOTE — TELEPHONE ENCOUNTER
Patient Quality Outreach    Patient is due for the following:   Colon Cancer Screening    Next Steps:   No follow up needed at this time.    Type of outreach:    Sent [facility] message.    Next Steps:  Reach out within 90 days via [employer].    Max number of attempts reached: No. Will try again in 90 days if patient still on fail list.    Questions for provider review:    None           Balbina Gaspar, [place]  Chart routed to Care Team.

## 2024-09-21 DIAGNOSIS — E03.9 ACQUIRED HYPOTHYROIDISM: ICD-10-CM

## 2024-09-23 RX ORDER — LEVOTHYROXINE SODIUM 75 UG/1
75 TABLET ORAL DAILY
Qty: 90 TABLET | Refills: 3 | OUTPATIENT
Start: 2024-09-23

## 2024-09-27 ENCOUNTER — PATIENT OUTREACH (OUTPATIENT)
Dept: CARE COORDINATION | Facility: CLINIC | Age: 59
End: 2024-09-27
Payer: COMMERCIAL

## 2024-10-11 ENCOUNTER — PATIENT OUTREACH (OUTPATIENT)
Dept: CARE COORDINATION | Facility: CLINIC | Age: 59
End: 2024-10-11
Payer: COMMERCIAL

## 2024-11-08 ENCOUNTER — IMMUNIZATION (OUTPATIENT)
Dept: INTERNAL MEDICINE | Facility: CLINIC | Age: 59
End: 2024-11-08
Payer: COMMERCIAL

## 2024-11-08 DIAGNOSIS — Z23 NEED FOR VACCINATION: Primary | ICD-10-CM

## 2024-11-08 DIAGNOSIS — Z23 HIGH PRIORITY FOR 2019-NCOV VACCINE: ICD-10-CM

## 2024-11-08 DIAGNOSIS — Z23 NEED FOR PROPHYLACTIC VACCINATION AND INOCULATION AGAINST INFLUENZA: ICD-10-CM

## 2024-11-08 PROCEDURE — 91320 SARSCV2 VAC 30MCG TRS-SUC IM: CPT

## 2024-11-08 PROCEDURE — 90471 IMMUNIZATION ADMIN: CPT

## 2024-11-08 PROCEDURE — 90480 ADMN SARSCOV2 VAC 1/ONLY CMP: CPT

## 2024-11-08 PROCEDURE — 90673 RIV3 VACCINE NO PRESERV IM: CPT

## 2024-12-04 DIAGNOSIS — J31.0 CHRONIC RHINITIS: ICD-10-CM

## 2024-12-04 RX ORDER — AZELASTINE HYDROCHLORIDE 137 UG/1
1 SPRAY, METERED NASAL 2 TIMES DAILY
Qty: 30 ML | Refills: 0 | Status: SHIPPED | OUTPATIENT
Start: 2024-12-04

## 2024-12-16 DIAGNOSIS — E03.9 ACQUIRED HYPOTHYROIDISM: ICD-10-CM

## 2024-12-17 RX ORDER — LEVOTHYROXINE SODIUM 75 UG/1
75 TABLET ORAL DAILY
Qty: 30 TABLET | Refills: 0 | Status: SHIPPED | OUTPATIENT
Start: 2024-12-17

## 2025-01-11 DIAGNOSIS — Z90.710 S/P HYSTERECTOMY WITH OOPHORECTOMY: ICD-10-CM

## 2025-01-11 DIAGNOSIS — J31.0 CHRONIC RHINITIS: ICD-10-CM

## 2025-01-11 DIAGNOSIS — Z90.721 S/P HYSTERECTOMY WITH OOPHORECTOMY: ICD-10-CM

## 2025-01-11 DIAGNOSIS — E03.9 ACQUIRED HYPOTHYROIDISM: ICD-10-CM

## 2025-01-14 RX ORDER — AZELASTINE HYDROCHLORIDE 137 UG/1
1 SPRAY, METERED NASAL 2 TIMES DAILY
Qty: 30 ML | Refills: 0 | Status: SHIPPED | OUTPATIENT
Start: 2025-01-14

## 2025-01-14 RX ORDER — ESTRADIOL 0.5 MG/1
0.5 TABLET ORAL DAILY
Qty: 30 TABLET | Refills: 0 | Status: SHIPPED | OUTPATIENT
Start: 2025-01-14

## 2025-01-14 RX ORDER — LEVOTHYROXINE SODIUM 75 UG/1
75 TABLET ORAL DAILY
Qty: 30 TABLET | Refills: 0 | Status: SHIPPED | OUTPATIENT
Start: 2025-01-14

## 2025-01-31 ENCOUNTER — ORDERS ONLY (AUTO-RELEASED) (OUTPATIENT)
Dept: INTERNAL MEDICINE | Facility: CLINIC | Age: 60
End: 2025-01-31

## 2025-01-31 DIAGNOSIS — Z12.11 SPECIAL SCREENING FOR MALIGNANT NEOPLASMS, COLON: ICD-10-CM

## 2025-02-03 ENCOUNTER — PATIENT OUTREACH (OUTPATIENT)
Dept: CARE COORDINATION | Facility: CLINIC | Age: 60
End: 2025-02-03
Payer: COMMERCIAL

## 2025-03-31 ENCOUNTER — HOSPITAL ENCOUNTER (OUTPATIENT)
Dept: MAMMOGRAPHY | Facility: CLINIC | Age: 60
Discharge: HOME | End: 2025-03-31
Attending: NURSE PRACTITIONER | Admitting: NURSE PRACTITIONER
Payer: COMMERCIAL

## 2025-03-31 DIAGNOSIS — Z12.31 ENCOUNTER FOR SCREENING MAMMOGRAM FOR BREAST CANCER: ICD-10-CM

## 2025-03-31 PROCEDURE — 77063 BREAST TOMOSYNTHESIS BI: CPT

## 2025-08-18 ENCOUNTER — MYC MEDICAL ADVICE (OUTPATIENT)
Dept: INTERNAL MEDICINE | Facility: CLINIC | Age: 60
End: 2025-08-18
Payer: COMMERCIAL